# Patient Record
Sex: FEMALE | Race: BLACK OR AFRICAN AMERICAN | NOT HISPANIC OR LATINO | Employment: OTHER | URBAN - METROPOLITAN AREA
[De-identification: names, ages, dates, MRNs, and addresses within clinical notes are randomized per-mention and may not be internally consistent; named-entity substitution may affect disease eponyms.]

---

## 2022-04-07 RX ORDER — HYDRALAZINE HYDROCHLORIDE 50 MG/1
75 TABLET, FILM COATED ORAL 3 TIMES DAILY
COMMUNITY
End: 2022-09-02

## 2022-04-07 RX ORDER — GLIMEPIRIDE 1 MG/1
1 TABLET ORAL
COMMUNITY
End: 2022-06-23

## 2022-04-07 RX ORDER — CYANOCOBALAMIN (VITAMIN B-12) 500 MCG
TABLET ORAL
COMMUNITY
End: 2022-04-08

## 2022-04-07 RX ORDER — MUPIROCIN 20 MG/G
OINTMENT TOPICAL
COMMUNITY
End: 2022-09-02

## 2022-04-07 RX ORDER — PNV NO.95/FERROUS FUM/FOLIC AC 28MG-0.8MG
100 TABLET ORAL DAILY
COMMUNITY

## 2022-04-07 RX ORDER — CALCITRIOL 0.25 UG/1
0.5 CAPSULE ORAL DAILY
COMMUNITY
End: 2022-07-11 | Stop reason: SDUPTHER

## 2022-04-07 RX ORDER — AMLODIPINE BESYLATE 5 MG/1
5 TABLET ORAL 2 TIMES DAILY
COMMUNITY
End: 2023-01-06 | Stop reason: SDUPTHER

## 2022-04-07 RX ORDER — METFORMIN HYDROCHLORIDE 1000 MG/1
TABLET ORAL
COMMUNITY
End: 2022-04-08

## 2022-04-07 RX ORDER — VIT C/E/ZN/COPPR/LUTEIN/ZEAXAN 250MG-90MG
CAPSULE ORAL
COMMUNITY
End: 2022-04-08

## 2022-04-07 RX ORDER — VALSARTAN 160 MG/1
320 TABLET ORAL DAILY
COMMUNITY
End: 2022-06-23

## 2022-04-07 RX ORDER — DOXYCYCLINE 100 MG/1
CAPSULE ORAL
COMMUNITY
End: 2022-04-08

## 2022-04-07 RX ORDER — HYDROCODONE BITARTRATE AND ACETAMINOPHEN 5; 325 MG/1; MG/1
TABLET ORAL
COMMUNITY
End: 2022-04-08

## 2022-04-07 RX ORDER — SODIUM BICARBONATE 325 MG/1
650 TABLET ORAL 2 TIMES DAILY
COMMUNITY
End: 2022-11-04 | Stop reason: SDUPTHER

## 2022-04-07 RX ORDER — ATORVASTATIN CALCIUM 20 MG/1
20 TABLET, FILM COATED ORAL DAILY
COMMUNITY

## 2022-04-07 RX ORDER — FLUTICASONE PROPIONATE 50 MCG
2 SPRAY, SUSPENSION (ML) NASAL
COMMUNITY

## 2022-04-08 ENCOUNTER — OFFICE VISIT (OUTPATIENT)
Dept: NEPHROLOGY | Facility: CLINIC | Age: 72
End: 2022-04-08
Payer: MEDICARE

## 2022-04-08 VITALS
BODY MASS INDEX: 32.85 KG/M2 | HEIGHT: 66 IN | DIASTOLIC BLOOD PRESSURE: 74 MMHG | OXYGEN SATURATION: 99 % | HEART RATE: 57 BPM | SYSTOLIC BLOOD PRESSURE: 207 MMHG | TEMPERATURE: 98 F | WEIGHT: 204.38 LBS

## 2022-04-08 DIAGNOSIS — E11.9 DIABETES MELLITUS TYPE II, NON INSULIN DEPENDENT: Primary | ICD-10-CM

## 2022-04-08 DIAGNOSIS — N18.5 STAGE 5 CHRONIC KIDNEY DISEASE NOT ON CHRONIC DIALYSIS: ICD-10-CM

## 2022-04-08 DIAGNOSIS — R80.9 NEPHROTIC RANGE PROTEINURIA: ICD-10-CM

## 2022-04-08 DIAGNOSIS — E87.20 METABOLIC ACIDOSIS: ICD-10-CM

## 2022-04-08 DIAGNOSIS — I10 ESSENTIAL HYPERTENSION: ICD-10-CM

## 2022-04-08 DIAGNOSIS — N25.81 SECONDARY HYPERPARATHYROIDISM OF RENAL ORIGIN: ICD-10-CM

## 2022-04-08 PROCEDURE — 4010F ACE/ARB THERAPY RXD/TAKEN: CPT | Mod: CPTII,,, | Performed by: NURSE PRACTITIONER

## 2022-04-08 PROCEDURE — 1160F PR REVIEW ALL MEDS BY PRESCRIBER/CLIN PHARMACIST DOCUMENTED: ICD-10-PCS | Mod: CPTII,,, | Performed by: NURSE PRACTITIONER

## 2022-04-08 PROCEDURE — 99214 PR OFFICE/OUTPT VISIT, EST, LEVL IV, 30-39 MIN: ICD-10-PCS | Mod: ,,, | Performed by: NURSE PRACTITIONER

## 2022-04-08 PROCEDURE — 3066F NEPHROPATHY DOC TX: CPT | Mod: CPTII,,, | Performed by: NURSE PRACTITIONER

## 2022-04-08 PROCEDURE — 4010F PR ACE/ARB THEARPY RXD/TAKEN: ICD-10-PCS | Mod: CPTII,,, | Performed by: NURSE PRACTITIONER

## 2022-04-08 PROCEDURE — 1160F RVW MEDS BY RX/DR IN RCRD: CPT | Mod: CPTII,,, | Performed by: NURSE PRACTITIONER

## 2022-04-08 PROCEDURE — 3066F PR DOCUMENTATION OF TREATMENT FOR NEPHROPATHY: ICD-10-PCS | Mod: CPTII,,, | Performed by: NURSE PRACTITIONER

## 2022-04-08 PROCEDURE — 3078F DIAST BP <80 MM HG: CPT | Mod: CPTII,,, | Performed by: NURSE PRACTITIONER

## 2022-04-08 PROCEDURE — 1159F MED LIST DOCD IN RCRD: CPT | Mod: CPTII,,, | Performed by: NURSE PRACTITIONER

## 2022-04-08 PROCEDURE — 3288F FALL RISK ASSESSMENT DOCD: CPT | Mod: CPTII,,, | Performed by: NURSE PRACTITIONER

## 2022-04-08 PROCEDURE — 1159F PR MEDICATION LIST DOCUMENTED IN MEDICAL RECORD: ICD-10-PCS | Mod: CPTII,,, | Performed by: NURSE PRACTITIONER

## 2022-04-08 PROCEDURE — 1101F PT FALLS ASSESS-DOCD LE1/YR: CPT | Mod: CPTII,,, | Performed by: NURSE PRACTITIONER

## 2022-04-08 PROCEDURE — 3078F PR MOST RECENT DIASTOLIC BLOOD PRESSURE < 80 MM HG: ICD-10-PCS | Mod: CPTII,,, | Performed by: NURSE PRACTITIONER

## 2022-04-08 PROCEDURE — 1126F AMNT PAIN NOTED NONE PRSNT: CPT | Mod: CPTII,,, | Performed by: NURSE PRACTITIONER

## 2022-04-08 PROCEDURE — 1101F PR PT FALLS ASSESS DOC 0-1 FALLS W/OUT INJ PAST YR: ICD-10-PCS | Mod: CPTII,,, | Performed by: NURSE PRACTITIONER

## 2022-04-08 PROCEDURE — 3288F PR FALLS RISK ASSESSMENT DOCUMENTED: ICD-10-PCS | Mod: CPTII,,, | Performed by: NURSE PRACTITIONER

## 2022-04-08 PROCEDURE — 3077F SYST BP >= 140 MM HG: CPT | Mod: CPTII,,, | Performed by: NURSE PRACTITIONER

## 2022-04-08 PROCEDURE — 3077F PR MOST RECENT SYSTOLIC BLOOD PRESSURE >= 140 MM HG: ICD-10-PCS | Mod: CPTII,,, | Performed by: NURSE PRACTITIONER

## 2022-04-08 PROCEDURE — 99214 OFFICE O/P EST MOD 30 MIN: CPT | Mod: ,,, | Performed by: NURSE PRACTITIONER

## 2022-04-08 PROCEDURE — 3008F BODY MASS INDEX DOCD: CPT | Mod: CPTII,,, | Performed by: NURSE PRACTITIONER

## 2022-04-08 PROCEDURE — 3008F PR BODY MASS INDEX (BMI) DOCUMENTED: ICD-10-PCS | Mod: CPTII,,, | Performed by: NURSE PRACTITIONER

## 2022-04-08 PROCEDURE — 1126F PR PAIN SEVERITY QUANTIFIED, NO PAIN PRESENT: ICD-10-PCS | Mod: CPTII,,, | Performed by: NURSE PRACTITIONER

## 2022-04-08 NOTE — PROGRESS NOTES
Pt Name:  Mekhi Tinsley  Pt :  1950  Pt MRN:  [id number]    Date: 2022    Reason for visit:   Mekhi Tinsley is a 72 y.o. aa female presenting for CKD follow up with serum creatinine 3.37, eGFR 15 and Chronic Kidney Disease Stage 5.     Chief Complaint:   The patient denies any complaints today.    HPI:  The patient is being seen today for follow up CKD and the status of her kidney function. Since the last visit, patient reports no health changes.  The patient denies fatigue, weakness, poor appetite, metallic taste, nausea, cramping, chest pain, palpitations, SOB, orthopnea, PND, edema, trouble concentrating, decreased urination.   She is accompanied to her visit today by her daughter, Justine Tinsley.     Home BPs when checked are <130 - 170/70 - 80 per patient. The patient is following a low sodium diet. The patient is avoiding NSAIDs. The patient is drinking 64 oz of water daily.     Since last visit on 22 patient reports no changes in medical history.      History:   Past Medical History:   Diagnosis Date    Diverticulosis     DM type 2 (diabetes mellitus, type 2)     DVT (deep vein thrombosis) in pregnancy     HTN (hypertension)     Mild intermittent asthma, uncomplicated     PAD (peripheral artery disease)     S/P IVC filter      Past Surgical History:   Procedure Laterality Date     VENA CAVA FILTER PLACEMENT      COLONOSCOPY      HYSTERECTOMY       Family History   Problem Relation Age of Onset    Hypertension Mother     Hypertension Father     Hypertension Sister     Heart failure Sister     Hypertension Brother      Social History     Substance and Sexual Activity   Alcohol Use Never     Social History     Substance and Sexual Activity   Drug Use Never     Social History     Substance and Sexual Activity   Sexual Activity Never     reports never being sexually active.  Social History     Tobacco Use   Smoking Status Never Smoker   Smokeless Tobacco Never Used        Allergies:  Review of patient's allergies indicates:   Allergen Reactions    Ace inhibitors Swelling    Levofloxacin Other (See Comments)     Dropped fsbs to 50      Irbesartan Nausea Only    Tramadol Nausea Only         Current Outpatient Medications:     amLODIPine (NORVASC) 5 MG tablet, Take 5 mg by mouth once daily., Disp: , Rfl:     atorvastatin (LIPITOR) 20 MG tablet, Take 20 mg by mouth once daily., Disp: , Rfl:     calcitRIOL (ROCALTROL) 0.25 MCG Cap, Take 0.25 mcg by mouth once daily., Disp: , Rfl:     cyanocobalamin (VITAMIN B-12) 100 MCG tablet, Vitamin B12  1 tablet qd, Disp: , Rfl:     fluticasone propionate (FLONASE) 50 mcg/actuation nasal spray, 1 spray as needed., Disp: , Rfl:     glimepiride (AMARYL) 1 MG tablet, Take 1 mg by mouth daily with breakfast., Disp: , Rfl:     hydrALAZINE (APRESOLINE) 50 MG tablet, Take 50 mg by mouth 3 (three) times daily., Disp: , Rfl:     metoprolol succinate 50 mg CSpX, Take 50 mg by mouth once daily at 6am., Disp: , Rfl:     mupirocin (BACTROBAN) 2 % ointment, mupirocin 2 % topical ointment  APPLY OINTMENT TOPICALLY TO AFFECTED AREA THREE TIMES DAILY, Disp: , Rfl:     sodium bicarbonate 325 MG tablet, Take 325 mg by mouth 2 (two) times daily., Disp: , Rfl:     valsartan (DIOVAN) 160 MG tablet, Take 320 mg by mouth once daily., Disp: , Rfl:     ROS:  Review of Systems   Constitutional: Negative for activity change and appetite change.   HENT: Negative for hearing loss.    Eyes: Negative for visual disturbance.   Respiratory: Negative for shortness of breath and wheezing.    Cardiovascular: Negative for chest pain.   Gastrointestinal: Negative for abdominal pain, diarrhea, nausea and vomiting.   Genitourinary: Negative for decreased urine volume, dysuria, flank pain, frequency and urgency.   Neurological: Negative for dizziness, weakness and headaches.       Physical Exam:   Vitals:   Vitals:    04/08/22 0946 04/08/22 1010   BP: (!) 191/72 (!)  "207/74   Pulse: (!) 57    Temp: 98.4 °F (36.9 °C)    SpO2: 99%    Weight: 92.7 kg (204 lb 5.9 oz)    Height: 5' 6" (1.676 m)    PainSc: 0-No pain      Body mass index is 32.99 kg/m².    Physical Exam  Vitals and nursing note reviewed.   Constitutional:       General: She is not in acute distress.     Appearance: Normal appearance.   HENT:      Head: Normocephalic and atraumatic.      Mouth/Throat:      Mouth: Mucous membranes are moist.   Eyes:      Extraocular Movements: Extraocular movements intact.      Pupils: Pupils are equal, round, and reactive to light.   Cardiovascular:      Rate and Rhythm: Normal rate and regular rhythm.      Heart sounds: Murmur heard.    Systolic murmur is present with a grade of 3/6.  Pulmonary:      Effort: Pulmonary effort is normal.      Breath sounds: No wheezing, rhonchi or rales.   Musculoskeletal:         General: No swelling.      Right lower le+ Edema present.      Left lower le+ Edema present.   Skin:     General: Skin is warm and dry.   Neurological:      Mental Status: She is alert and oriented to person, place, and time.   Psychiatric:         Mood and Affect: Mood normal.         Behavior: Behavior normal.         Labs/Tests:  Component   Ref Range & Units 1 d ago 9 d ago 1 mo ago 2 mo ago 3 mo ago 4 mo ago 5 mo ago     Sodium   136 - 147 mmol/L 143  142  140  142  141  142  142     Potassium   3.5 - 5.1 mmol/L 3.9  3.7  3.8  4.0  4.0  4.1  4.4     Chloride   98 - 107 mmol/L 106  109 High   106  112 High   108 High   108 High   111 High      CO2   20 - 31 mmol/L 26  28  23  22  23  24  21     Glucose   70 - 99 mg/dL 88  89  96  86  71  80  79     BUN   9 - 23 mg/dL 30 High   30 High   35 High   26 High   31 High   28 High   42 High      Creatinine   0.55 - 1.02 mg/dL 3.37 High   3.24 High   2.90 High   2.42 High   2.90 High   2.49 High   2.95 High      Calcium   8.3 - 10.6 mg/dL 8.6  8.0 Low   8.7  8.8  9.0  9.2  9.1     Phosphorus   2.4 - 5.1 mg/dL 4.2    3.1     "    Albumin   3.2 - 4.8 g/dL 3.2  3.0 Low   3.3  3.4  3.7  3.9  4.1     eGFR CKD-EPI Non Af Amer   >90 mL/min/1.73m2 13 Low   13 Low   15 Low   19 Low   15 Low   19 Low   15 Low      eGFR CKD-EPI Af Amer   >90 mL/min/1.73m2 15 Low   15 Low   18 Low   22 Low  CM  18 Low   21 Low   1      Component   Ref Range & Units 1 d ago   (4/7/22) 2 mo ago   (1/26/22) 6 mo ago   (9/16/21) 9 mo ago   (6/22/21) 1 yr ago   (12/29/20) 1 yr ago   (9/29/20) 1 yr ago   (6/29/20)    PTH, Intact   15 - 65 pg/mL 448 High   318 High   236 High   247 High   225 High   180 High   251 High      Hemoglobin   11.3 - 15.4 g/dL 12.3  11.6  12.0  12.2  10.5 Low   10.6 Low   9.8 Low       Component   Ref Range & Units 1 d ago   (4/7/22) 2 mo ago   (1/26/22) 6 mo ago   (9/16/21) 9 mo ago   (6/22/21) 1 yr ago   (12/29/20) 1 yr ago   (9/29/20) 1 yr ago   (6/29/20)     Protein Urine Random   mg/dL 797  769  258  85  85  380  538     Creatinine, Ur   mg/dL 59.0  52.2  74.0  40.3  32.3  68.8  83.0     Protein/Creat Ratio   <0.2 mg of protein/mg of creatinine 13.5 High   14.7 High   3.5 High   2.1 High   2.6 High   5.5 High   6.5 High            Diagnosis:  1. Diabetes mellitus type II, non insulin dependent  Protein/Creatinine Ratio, Urine    Renal Function Panel    Protein/Creatinine Ratio, Urine    Renal Function Panel   2. Essential hypertension  Renal Function Panel    Renal Function Panel   3. Nephrotic range proteinuria  Protein/Creatinine Ratio, Urine    Protein/Creatinine Ratio, Urine   4. Secondary hyperparathyroidism of renal origin  PTH, Intact    PTH, Intact   5. Metabolic acidosis  Renal Function Panel    Renal Function Panel   6. Stage 5 chronic kidney disease not on chronic dialysis  CBC Auto Differential    Protein/Creatinine Ratio, Urine    PTH, Intact    Renal Function Panel    CBC Auto Differential    Protein/Creatinine Ratio, Urine    PTH, Intact    Renal Function Panel    US Vein Mapping, Hemodialysis, Bilateral    Ambulatory  referral/consult to General Surgery    US Vein Mapping, Hemodialysis, Bilateral       Plan:  1. Diabetes mellitus type II, non insulin dependent - control status unknown - Suggest to PCP to d/c  Glimepiride 1 mg po daily (not recommended to use with eGFR < 30) but she is on a small dose.  Would recommend Glipizide as an alternative.    2. Essential hypertension - not at goal - Monitor BP twice daily for 2 weeks and return readings, 2 Gram NA diet, Continue Toprol XL 50 mg po daily,  Increase Norvasc 10 mg po daily, Continue Hydralazine to 50 mg po TID, Increase Diovan to 320 mg po daily    3. Nephrotic range proteinuria - 13,500 mg - down from 14,700 mg - Continue Diovan to 320 mg po daily    4. Secondary hyperparathyroidism of renal origin -  - up from 318 -  Increase Rocaltrol 50 mcg po daily    5. Metabolic acidosis - C02 26 - Continue NA Hc03 650 mg po BID    6. Stage 5 chronic kidney disease not on chronic dialysis - serum creatinine 3.37 / eGFR 15; Avoid NSAIDS, Assure 64 oz of water daily, Meds per # 1, 2, 4, 5 above; Schedule Vein Mapping; Refer to Surgeon for vascular access; She has attended a CKD education class          My reconciliation of medication should not condone or support use of medications that have been previously prescribed for patients by other providers.  I am only documenting the current medications patients is taking and may change doses, add or discontinue medications based on information provided by the patient.     The patient has been provided with their current level of kidney function including eGFR and creatinine.    We discussed the potential for common complications of CKD including anemia, electrolyte abnormalities, abnormal fluid balance, mineral bone disease and malnutrition.    We discussed strategies to slow the progression of their kidney disease includin. Avoid nephrotoxic agents. Avoid over-the-counter and prescription NSAIDs (Ibuprofren, Motrin, Naproxyn,  Aleve, Mobic, Celebrex, Toradol, Advil). All of these can worsen kidney function, elevate BP, cause fluid retention/swelling and elevate potassium. Avoid iodine contrast agents and gadolinium, which can worsen kidney function and/or cause kidney failure. Avoid phosphosoda bowel preps which can worsen kidney function.  2. Work to improve modifiable risk factors. Aim for good control of blood glucose without episodes of hypoglycemia. Notify the provider managing your diabetes if your blood glucose < 60. Aim for good blood pressure control without episodes of hypotension. Call the office if your systolic blood pressure is consistently < 110. Aim for good control of your cholesterol.  ? AIC goal <7.0  ? BP goal <130/80        Keeping these in goal range will help prevent progression of cardiovascular disease            and chronic kidney disease.    We discussed dietary modifications:  ? Low sodium diet: 2 gm/d or less  ? Limit/avoid high potassium foods  ? Avoid potassium containing salt substitutes  ? Limit/avoid high phosphorus foods  ? Limit daily protein intake to 0.8-1 gm/kg of your ideal body weight.    We discussed lifestyle modifications:  ? Make sure you are drinking plenty of fluids--64 ounces (1/2 gallon) daily  ? Exercise at least 30 minutes 5 x per week (total 150 minutes per week), example brisk walking  ? Achieve and maintain a healthy weight (BMI 20-25)  ? Limit alcohol consumption to <2 drinks per day  ? Stop smoking  ? Make sure you stay current on important vaccines-- pneumonia vaccines (Pneumovax and Prevnar), flu vaccine, Hepatitis B (especially patients nearing renal replacement therapy and planning hemodialysis) and Covid-19 vaccine.     Recommendations:  1. Monitor your BP at home daily and record.  2. Bring readings to your next appt.  3. Call the office if your BP is persistently >130/80.  4. Seek urgent medical attention with signs and symptoms of uremia - extreme weakness, fatigue,  confusion, anorexia, metallic taste in mouth, hiccoughs, cramping, itching, chest pain, swelling, or trouble sleeping.    Follow Up:   Follow up in about 3 months (around 7/8/2022).

## 2022-04-08 NOTE — PATIENT INSTRUCTIONS
The patient has been provided with their current level of kidney function including eGFR and creatinine.    We discussed the potential for common complications of CKD including anemia, electrolyte abnormalities, abnormal fluid balance, mineral bone disease and malnutrition.    We discussed strategies to slow the progression of their kidney disease including:  Avoid nephrotoxic agents. Avoid over-the-counter and prescription NSAIDs (Ibuprofren, Motrin, Naproxyn, Aleve, Mobic, Celebrex, Toradol, Advil). All of these can worsen kidney function, elevate BP, cause fluid retention/swelling and elevate potassium. Avoid iodine contrast agents and gadolinium, which can worsen kidney function and/or cause kidney failure. Avoid phosphosoda bowel preps which can worsen kidney function.  Work to improve modifiable risk factors. Aim for good control of blood glucose without episodes of hypoglycemia. Notify the provider managing your diabetes if your blood glucose < 60. Aim for good blood pressure control without episodes of hypotension. Call the office if your systolic blood pressure is consistently < 110. Aim for good control of your cholesterol.  AIC goal <7.0  BP goal <130/80        Keeping these in goal range will help prevent progression of cardiovascular disease            and chronic kidney disease.    We discussed dietary modifications:  Low sodium diet: 2 gm/d or less  Limit/avoid high potassium foods  Avoid potassium containing salt substitutes  Limit/avoid high phosphorus foods  Limit daily protein intake to 0.8-1 gm/kg of your ideal body weight.    We discussed lifestyle modifications:  Make sure you are drinking plenty of fluids--64 ounces (1/2 gallon) daily  Exercise at least 30 minutes 5 x per week (total 150 minutes per week), example brisk walking  Achieve and maintain a healthy weight (BMI 20-25)  Limit alcohol consumption to <2 drinks per day  Stop smoking  Make sure you stay current on important vaccines--  pneumonia vaccines (Pneumovax and Prevnar), flu vaccine, Hepatitis B (especially patients nearing renal replacement therapy and planning hemodialysis) and Covid-19 vaccine.     Recommendations:  Monitor your BP at home daily and record.  Bring readings to your next appt.  Call the office if your BP is persistently >130/80.  Seek urgent medical attention with signs and symptoms of uremia - extreme weakness, fatigue, confusion, anorexia, metallic taste in mouth, hiccoughs, cramping, itching, chest pain, swelling, or trouble sleeping.

## 2022-06-22 PROBLEM — D63.1 ANEMIA IN STAGE 5 CHRONIC KIDNEY DISEASE, NOT ON CHRONIC DIALYSIS: Status: ACTIVE | Noted: 2022-06-22

## 2022-06-22 PROBLEM — N18.5 ANEMIA IN STAGE 5 CHRONIC KIDNEY DISEASE, NOT ON CHRONIC DIALYSIS: Status: ACTIVE | Noted: 2022-06-22

## 2022-06-23 ENCOUNTER — OFFICE VISIT (OUTPATIENT)
Dept: NEPHROLOGY | Facility: CLINIC | Age: 72
End: 2022-06-23
Payer: MEDICARE

## 2022-06-23 VITALS
DIASTOLIC BLOOD PRESSURE: 64 MMHG | HEIGHT: 66 IN | OXYGEN SATURATION: 97 % | TEMPERATURE: 99 F | BODY MASS INDEX: 31.63 KG/M2 | HEART RATE: 60 BPM | WEIGHT: 196.81 LBS | SYSTOLIC BLOOD PRESSURE: 179 MMHG

## 2022-06-23 DIAGNOSIS — E87.20 METABOLIC ACIDOSIS: ICD-10-CM

## 2022-06-23 DIAGNOSIS — R80.9 NEPHROTIC RANGE PROTEINURIA: ICD-10-CM

## 2022-06-23 DIAGNOSIS — N25.81 SECONDARY HYPERPARATHYROIDISM OF RENAL ORIGIN: ICD-10-CM

## 2022-06-23 DIAGNOSIS — I10 ESSENTIAL HYPERTENSION: ICD-10-CM

## 2022-06-23 DIAGNOSIS — E11.9 DIABETES MELLITUS TYPE II, NON INSULIN DEPENDENT: Primary | ICD-10-CM

## 2022-06-23 DIAGNOSIS — N18.5 STAGE 5 CHRONIC KIDNEY DISEASE NOT ON CHRONIC DIALYSIS: ICD-10-CM

## 2022-06-23 DIAGNOSIS — N18.5 ANEMIA IN STAGE 5 CHRONIC KIDNEY DISEASE, NOT ON CHRONIC DIALYSIS: ICD-10-CM

## 2022-06-23 DIAGNOSIS — D63.1 ANEMIA IN STAGE 5 CHRONIC KIDNEY DISEASE, NOT ON CHRONIC DIALYSIS: ICD-10-CM

## 2022-06-23 PROCEDURE — 99214 PR OFFICE/OUTPT VISIT, EST, LEVL IV, 30-39 MIN: ICD-10-PCS | Mod: ,,, | Performed by: NURSE PRACTITIONER

## 2022-06-23 PROCEDURE — 3066F PR DOCUMENTATION OF TREATMENT FOR NEPHROPATHY: ICD-10-PCS | Mod: CPTII,,, | Performed by: NURSE PRACTITIONER

## 2022-06-23 PROCEDURE — 3066F NEPHROPATHY DOC TX: CPT | Mod: CPTII,,, | Performed by: NURSE PRACTITIONER

## 2022-06-23 PROCEDURE — 1160F PR REVIEW ALL MEDS BY PRESCRIBER/CLIN PHARMACIST DOCUMENTED: ICD-10-PCS | Mod: CPTII,,, | Performed by: NURSE PRACTITIONER

## 2022-06-23 PROCEDURE — 3078F PR MOST RECENT DIASTOLIC BLOOD PRESSURE < 80 MM HG: ICD-10-PCS | Mod: CPTII,,, | Performed by: NURSE PRACTITIONER

## 2022-06-23 PROCEDURE — 3288F FALL RISK ASSESSMENT DOCD: CPT | Mod: CPTII,,, | Performed by: NURSE PRACTITIONER

## 2022-06-23 PROCEDURE — 99214 OFFICE O/P EST MOD 30 MIN: CPT | Mod: ,,, | Performed by: NURSE PRACTITIONER

## 2022-06-23 PROCEDURE — 1159F PR MEDICATION LIST DOCUMENTED IN MEDICAL RECORD: ICD-10-PCS | Mod: CPTII,,, | Performed by: NURSE PRACTITIONER

## 2022-06-23 PROCEDURE — 1126F AMNT PAIN NOTED NONE PRSNT: CPT | Mod: CPTII,,, | Performed by: NURSE PRACTITIONER

## 2022-06-23 PROCEDURE — 1101F PT FALLS ASSESS-DOCD LE1/YR: CPT | Mod: CPTII,,, | Performed by: NURSE PRACTITIONER

## 2022-06-23 PROCEDURE — 1160F RVW MEDS BY RX/DR IN RCRD: CPT | Mod: CPTII,,, | Performed by: NURSE PRACTITIONER

## 2022-06-23 PROCEDURE — 1159F MED LIST DOCD IN RCRD: CPT | Mod: CPTII,,, | Performed by: NURSE PRACTITIONER

## 2022-06-23 PROCEDURE — 3008F BODY MASS INDEX DOCD: CPT | Mod: CPTII,,, | Performed by: NURSE PRACTITIONER

## 2022-06-23 PROCEDURE — 1101F PR PT FALLS ASSESS DOC 0-1 FALLS W/OUT INJ PAST YR: ICD-10-PCS | Mod: CPTII,,, | Performed by: NURSE PRACTITIONER

## 2022-06-23 PROCEDURE — 3077F PR MOST RECENT SYSTOLIC BLOOD PRESSURE >= 140 MM HG: ICD-10-PCS | Mod: CPTII,,, | Performed by: NURSE PRACTITIONER

## 2022-06-23 PROCEDURE — 3008F PR BODY MASS INDEX (BMI) DOCUMENTED: ICD-10-PCS | Mod: CPTII,,, | Performed by: NURSE PRACTITIONER

## 2022-06-23 PROCEDURE — 3078F DIAST BP <80 MM HG: CPT | Mod: CPTII,,, | Performed by: NURSE PRACTITIONER

## 2022-06-23 PROCEDURE — 1126F PR PAIN SEVERITY QUANTIFIED, NO PAIN PRESENT: ICD-10-PCS | Mod: CPTII,,, | Performed by: NURSE PRACTITIONER

## 2022-06-23 PROCEDURE — 3288F PR FALLS RISK ASSESSMENT DOCUMENTED: ICD-10-PCS | Mod: CPTII,,, | Performed by: NURSE PRACTITIONER

## 2022-06-23 PROCEDURE — 3077F SYST BP >= 140 MM HG: CPT | Mod: CPTII,,, | Performed by: NURSE PRACTITIONER

## 2022-06-23 RX ORDER — GLIPIZIDE 2.5 MG/1
2.5 TABLET, EXTENDED RELEASE ORAL DAILY
COMMUNITY
End: 2022-11-04 | Stop reason: SDUPTHER

## 2022-06-23 NOTE — PROGRESS NOTES
Pt Name:  Mekhi Tinsley  Pt :  1950  Pt MRN:  [id number]    Date: 2022    Reason for visit:   Mekhi Tinsley is a 72 y.o. aa female presenting for CKD follow up with serum creatinine 4.38, eGFR 11 and Chronic Kidney Disease Stage 5.     Chief Complaint:   The patient denies any complaints today.    HPI:  The patient is being seen today for follow up CKD and the status of her kidney function. Since the last visit, patient reports/record review reveals she completed the vein mapping on 22..  Was referred to Dr. Abraham for AV access but has not been seen by him / no appointment contact made from his office following the referral that was placed on 22 at her last OV. The patient denies fatigue, weakness, poor appetite, metallic taste, nausea, cramping, chest pain, palpitations, SOB, orthopnea, PND, edema, trouble concentrating, decreased urination.      Home BPs when checked are <130 -170/80 per patient. She reports her PCP, Cierra Hill stopped her Losartan on or around 22.  The patient is following a low sodium diet. The patient is avoiding NSAIDs. The patient is drinking 48 - 64 oz of water daily.     Since last visit on 22 patient reports above noted changes in medical history.      History:   Past Medical History:   Diagnosis Date    Disorder of kidney and ureter     Diverticulosis     DM type 2 (diabetes mellitus, type 2)     DVT (deep vein thrombosis) in pregnancy     HTN (hypertension)     Mild intermittent asthma, uncomplicated     PAD (peripheral artery disease)     S/P IVC filter      Past Surgical History:   Procedure Laterality Date     VENA CAVA FILTER PLACEMENT      COLONOSCOPY      HYSTERECTOMY       Family History   Problem Relation Age of Onset    Hypertension Mother     Hypertension Father     Hypertension Sister     Heart failure Sister     Hypertension Brother      Social History     Substance and Sexual Activity   Alcohol Use Never      Social History     Substance and Sexual Activity   Drug Use Never     Social History     Substance and Sexual Activity   Sexual Activity Never     reports never being sexually active.  Social History     Tobacco Use   Smoking Status Never Smoker   Smokeless Tobacco Never Used       Allergies:  Review of patient's allergies indicates:   Allergen Reactions    Ace inhibitors Swelling    Levofloxacin Other (See Comments)     Dropped fsbs to 50      Irbesartan Nausea Only    Tramadol Nausea Only         Current Outpatient Medications:     amLODIPine (NORVASC) 5 MG tablet, Take 10 mg by mouth once daily., Disp: , Rfl:     atorvastatin (LIPITOR) 20 MG tablet, Take 20 mg by mouth once daily., Disp: , Rfl:     calcitRIOL (ROCALTROL) 0.25 MCG Cap, Take 0.5 mcg by mouth once daily., Disp: , Rfl:     cyanocobalamin (VITAMIN B-12) 100 MCG tablet, Vitamin B12  1 tablet qd, Disp: , Rfl:     fluticasone propionate (FLONASE) 50 mcg/actuation nasal spray, 1 spray as needed., Disp: , Rfl:     glipiZIDE (GLUCOTROL) 2.5 MG TR24, Take 2.5 mg by mouth once daily at 6am., Disp: , Rfl:     hydrALAZINE (APRESOLINE) 50 MG tablet, Take 75 mg by mouth 3 (three) times daily., Disp: , Rfl:     metoprolol succinate 50 mg CSpX, Take 50 mg by mouth once daily at 6am., Disp: , Rfl:     mupirocin (BACTROBAN) 2 % ointment, mupirocin 2 % topical ointment  APPLY OINTMENT TOPICALLY TO AFFECTED AREA THREE TIMES DAILY, Disp: , Rfl:     sodium bicarbonate 325 MG tablet, Take 650 mg by mouth 2 (two) times daily., Disp: , Rfl:     ROS:  Review of Systems   Constitutional: Negative for activity change and appetite change.   HENT: Negative for hearing loss.    Eyes: Negative for visual disturbance.   Respiratory: Negative for shortness of breath and wheezing.    Cardiovascular: Negative for chest pain.   Gastrointestinal: Negative for abdominal pain, diarrhea, nausea and vomiting.   Genitourinary: Negative for decreased urine volume, dysuria,  "flank pain, frequency and urgency.   Neurological: Negative for dizziness, weakness and headaches.       Physical Exam:   Vitals:   Vitals:    22 1540 22 1556   BP: (!) 170/70 (!) 179/64   Pulse: 69 60   Temp: 98.9 °F (37.2 °C)    SpO2: 97%    Weight: 89.3 kg (196 lb 12.8 oz)    Height: 5' 6" (1.676 m)    PainSc: 0-No pain      Body mass index is 31.76 kg/m².    Physical Exam  Vitals reviewed.   Constitutional:       General: She is not in acute distress.     Appearance: Normal appearance.   HENT:      Head: Normocephalic and atraumatic.      Mouth/Throat:      Mouth: Mucous membranes are moist.   Eyes:      Extraocular Movements: Extraocular movements intact.      Pupils: Pupils are equal, round, and reactive to light.   Cardiovascular:      Rate and Rhythm: Normal rate and regular rhythm.      Heart sounds: Murmur heard.    Systolic murmur is present with a grade of 3/6.  Pulmonary:      Effort: Pulmonary effort is normal.      Breath sounds: No wheezing, rhonchi or rales.   Musculoskeletal:         General: No swelling.      Right lower le+ Edema present.      Left lower le+ Edema present.   Skin:     General: Skin is warm and dry.   Neurological:      Mental Status: She is alert and oriented to person, place, and time.   Psychiatric:         Mood and Affect: Mood normal.         Behavior: Behavior normal.           Labs/Tests:  Lab Results   Component Value Date     2022    K 4.2 2022     (H) 2022    CO2 28 2022    BUN 46 (H) 2022    CREATININE 4.38 (H) 2022    CREATININE 3.37 (H) 2022    CREATININE 3.24 (H) 2022    GLUCOSE 180 (H) 2022    CALCIUM 8.9 2022    ALBUMIN 3.6 2022    EGFRNONAA 9 (L) 2022    EGFRNONAA 13 (L) 2022    EGFRNONAA 13 (L) 2022     (H) 2022    HGB 10.6 (L) 2022    IRON 100 2022    TIBC 213 (L) 2022    FERRITIN 896.8 (H) 2022     Component " Ref Range & Units 12:32   (6/22/22) 2 mo ago   (4/7/22) 4 mo ago   (1/26/22) 9 mo ago   (9/16/21) 1 yr ago   (6/22/21) 1 yr ago   (12/29/20) 1 yr ago   (9/29/20)   Protein, Urine mg/dL mg/dL 637  797 R  769 R  258 R  85 R  85 R  380 R    Creatinine, Urine mg/dL mg/dL 79.2  59.0 R  52.2 R  74.0 R  40.3 R  32.3 R  68.8 R    Urine Protein/Creatinine Ratio <0.2 mg of protein/mg of creatinine 8.0 High   13.5 High   14.7 High   3.5 High   2.1 High   2.6 High   5.5 High            Diagnosis:  1. Diabetes mellitus type II, non insulin dependent  Renal Function Panel    Renal Function Panel   2. Essential hypertension  Renal Function Panel    Renal Function Panel   3. Nephrotic range proteinuria  Renal Function Panel    Protein/Creatinine Ratio, Urine    Renal Function Panel    Protein/Creatinine Ratio, Urine   4. Metabolic acidosis  Renal Function Panel    Renal Function Panel   5. Secondary hyperparathyroidism of renal origin  Renal Function Panel    PTH, Intact    Renal Function Panel    PTH, Intact   6. Stage 5 chronic kidney disease not on chronic dialysis  Renal Function Panel    Renal Function Panel   7. Anemia in stage 5 chronic kidney disease, not on chronic dialysis  Hemoglobin    Hemoglobin         Plan:    1. Diabetes mellitus type II, non insulin dependent - control status unknown - Continue Glucotrol    2. Essential hypertension - not at goal -  Monitor BP twice daily for 2 weeks and submit readings, 2 Gram NA diet, Continue Toprol XL 50 mg po daily, Norvasc 10 mg po daily, increase Hydralazine to 100 mg po TID,      3. Nephrotic range proteinuria - 800 mg - down from 13,500 mg - currently off ARB due to decline in eGFR per PCP    4. Metabolic acidosis - C02 28 - Continue NA Hc03 650 mg po BID    5. Secondary hyperparathyroidism of renal origin -  - Continue Rocaltrol 50 mcg po daily    6. Stage 5 chronic kidney disease not on chronic dialysis - serum creatinine 4.38 / eGFR 11; (last visit 3.37 /15)  Avoid NSAIDS, Assure 64 oz of water daily; Meds per # 1, 2, 3, 5   Has had vein mapping, has attended CKD; will re -refer to Dr. Abraham (patients choice) for AV access placement.    7. Anemia in stage 5 chronic kidney disease, not on chronic dialysis - Hgb 10.6 - No indication for AFIA therapy at this time.        My reconciliation of medication should not condone or support use of medications that have been previously prescribed for patients by other providers.  I am only documenting the current medications patients is taking and may change doses, add or discontinue medications based on information provided by the patient.     The patient has been provided with their current level of kidney function including eGFR and creatinine.    We discussed the potential for common complications of CKD including anemia, electrolyte abnormalities, abnormal fluid balance, mineral bone disease and malnutrition.    We discussed strategies to slow the progression of their kidney disease includin. Avoid nephrotoxic agents. Avoid over-the-counter and prescription NSAIDs (Ibuprofren, Motrin, Naproxyn, Aleve, Mobic, Celebrex, Toradol, Advil). All of these can worsen kidney function, elevate BP, cause fluid retention/swelling and elevate potassium. Avoid iodine contrast agents and gadolinium, which can worsen kidney function and/or cause kidney failure. Avoid phosphosoda bowel preps which can worsen kidney function.  2. Work to improve modifiable risk factors. Aim for good control of blood glucose without episodes of hypoglycemia. Notify the provider managing your diabetes if your blood glucose < 60. Aim for good blood pressure control without episodes of hypotension. Call the office if your systolic blood pressure is consistently < 110. Aim for good control of your cholesterol.  ? AIC goal <7.0  ? BP goal <130/80        Keeping these in goal range will help prevent progression of cardiovascular disease            and chronic kidney  disease.    We discussed dietary modifications:  ? Low sodium diet: 2 gm/d or less  ? Limit/avoid high potassium foods  ? Avoid potassium containing salt substitutes  ? Limit/avoid high phosphorus foods  ? Limit daily protein intake to 0.8-1 gm/kg of your ideal body weight.    We discussed lifestyle modifications:  ? Make sure you are drinking plenty of fluids--64 ounces (1/2 gallon) daily  ? Exercise at least 30 minutes 5 x per week (total 150 minutes per week), example brisk walking  ? Achieve and maintain a healthy weight (BMI 20-25)  ? Limit alcohol consumption to <2 drinks per day  ? Stop smoking  ? Make sure you stay current on important vaccines-- pneumonia vaccines (Pneumovax and Prevnar), flu vaccine, Hepatitis B (especially patients nearing renal replacement therapy and planning hemodialysis) and Covid-19 vaccine.     Recommendations:  1. Monitor your BP at home daily and record.  2. Bring readings to your next appt.  3. Call the office if your BP is persistently >130/80.  4. Seek urgent medical attention with signs and symptoms of uremia - extreme weakness, fatigue, confusion, anorexia, metallic taste in mouth, hiccoughs, cramping, itching, chest pain, swelling, or trouble sleeping.    Follow Up:   Follow up in about 10 weeks (around 9/1/2022).

## 2022-07-11 RX ORDER — CALCITRIOL 0.25 UG/1
0.5 CAPSULE ORAL DAILY
Qty: 180 CAPSULE | Refills: 3 | Status: SHIPPED | OUTPATIENT
Start: 2022-07-11 | End: 2022-12-02

## 2022-09-01 PROBLEM — E78.2 HYPERLIPIDEMIA, MIXED: Status: ACTIVE | Noted: 2022-09-01

## 2022-09-01 PROBLEM — Z88.8 ALLERGY TO ACE INHIBITORS: Status: ACTIVE | Noted: 2022-09-01

## 2022-09-01 NOTE — ASSESSMENT & PLAN NOTE
Serum creatinine 4.46 / eGFR 10; Avoid NSAIDS, Assure 64 oz of water daily, Meds per Med list above.     She has attended CKD Smart class, She was referred to Dr. Abraham on 4/8/22 for AV access placement but has not had an appointment still.  Will assure she is seen by Dr. Abraham and have an access placed - has appointment scheduled for 9/8/22 @ 8:30 a.m..  She is asymptomatic at this time without an access.  Will await starting hemodialysis.

## 2022-09-01 NOTE — ASSESSMENT & PLAN NOTE
Hgb 9.5 - Not on FAIA therapy.  Will check iron studies. She has been found to be iron deficient in the past.

## 2022-09-01 NOTE — ASSESSMENT & PLAN NOTE
Not at goal, but reports her home Bps are on average 130/80 with exceptions of occasional spikes up to 150 or 160 systolic.  She has Clonidine to take prn SBP > 180 but has only had to take one dose - Monitor BP twice daily for 2 weeks and submit readings, 2 Gram NA diet, Continue Norvasc 10 mg po daily, Metoprolol 50 mg po daily, Clonidine 0.1 mg prn SBP > 180, Hydralazine is supposed to be 100 mg po TID and she will check her medication bottle when she arrives at home and call back to verify.     Patient verified and called - Hydralazine 100 mg po TID.

## 2022-09-01 NOTE — PROGRESS NOTES
Pt Name:  Mekhi Tinsley  Pt :  1950  Pt MRN:  [id number]    Date: 2022    Reason for visit:   Mekhi Tinsley is a 72 y.o. aa female presenting for CKD follow up with serum creatinine 4.46, eGFR 10 and Chronic Kidney Disease Stage V.     Chief Complaint:   The patient denies any complaints today.    HPI:  The patient is being seen today for follow up CKD and the status of her kidney function. Since the last visit, patient reports/record review reveals she was seen at [facility] ER on 22 for c/o left arm pain. Her BP was elevated and she was treated with Hydralazine 10 mg IV x 2 and morphine 4 mg IV x 1. She was referred for AV access on 22 to Dr. Abraham and has still not been seen.  The patient denies fatigue, weakness, poor appetite, metallic taste, nausea, cramping, chest pain, palpitations, SOB, orthopnea, PND, edema, trouble concentrating, decreased urination.      Home BPs when checked are <130/80 per patient with occasional spikes up to 150 - 160 systolic.  She has Clonidine to take for SBP > 180 and reports she has only had to take one dose.  She didn't bring home meds with her to visit and is unsure of the dose of her Hydralazine.  The Hydralazine was increased last visit. She reports getting stressed out when she comes here because she is afraid she will have to start dialysis.  The patient is following a low sodium diet. The patient is avoiding NSAIDs. The patient is drinking 48 - 64 oz of water daily.     Since last visit on 22 patient reports above noted changes in medical history.      History:   Past Medical History:   Diagnosis Date    Disorder of kidney and ureter     Diverticulosis     DM type 2 (diabetes mellitus, type 2)     DVT (deep vein thrombosis) in pregnancy     HTN (hypertension)     Mild intermittent asthma, uncomplicated     PAD (peripheral artery disease)     S/P IVC filter      Past Surgical History:   Procedure Laterality Date     VENA CAVA FILTER PLACEMENT       COLONOSCOPY      HYSTERECTOMY       Family History   Problem Relation Age of Onset    Hypertension Mother     Hypertension Father     Hypertension Sister     Heart failure Sister     Hypertension Brother      Social History     Substance and Sexual Activity   Alcohol Use Never     Social History     Substance and Sexual Activity   Drug Use Never     Social History     Substance and Sexual Activity   Sexual Activity Never     reports never being sexually active.  Social History     Tobacco Use   Smoking Status Never   Smokeless Tobacco Never       Allergies:  Review of patient's allergies indicates:   Allergen Reactions    Ace inhibitors Swelling    Levofloxacin Other (See Comments)     Dropped fsbs to 50      Irbesartan Nausea Only    Tramadol Nausea Only         Current Outpatient Medications:     amLODIPine (NORVASC) 5 MG tablet, Take 10 mg by mouth once daily., Disp: , Rfl:     atorvastatin (LIPITOR) 20 MG tablet, Take 20 mg by mouth once daily., Disp: , Rfl:     calcitRIOL (ROCALTROL) 0.25 MCG Cap, Take 2 capsules (0.5 mcg total) by mouth once daily., Disp: 180 capsule, Rfl: 3    cloNIDine (CATAPRES) 0.1 MG tablet, Only takes prn if SBP > 180, Disp: , Rfl:     cyanocobalamin (VITAMIN B-12) 100 MCG tablet, Vitamin B12  1 tablet qd, Disp: , Rfl:     fluticasone propionate (FLONASE) 50 mcg/actuation nasal spray, 1 spray as needed., Disp: , Rfl:     gabapentin (NEURONTIN) 100 MG capsule, Take 100 mg by mouth 3 (three) times daily., Disp: , Rfl:     glipiZIDE (GLUCOTROL) 2.5 MG TR24, Take 2.5 mg by mouth once daily at 6am., Disp: , Rfl:     hydrALAZINE (APRESOLINE) 100 MG tablet, Take 100 mg by mouth 3 (three) times daily., Disp: , Rfl:     methocarbamoL (ROBAXIN) 500 MG Tab, Take 500 mg by mouth as needed., Disp: , Rfl:     metoprolol succinate 50 mg CSpX, Take 50 mg by mouth once daily at 6am., Disp: , Rfl:     prednisoLONE acetate (PRED FORTE) 1 % DrpS, prednisolone acetate 1 % eye drops,suspension, Disp: ,  "Rfl:     sodium bicarbonate 325 MG tablet, Take 650 mg by mouth 2 (two) times daily., Disp: , Rfl:     tiZANidine 4 mg Cap, Take 4 mg by mouth as needed., Disp: , Rfl:     PATADAY TWICE DAILY RELIEF 0.1 % ophthalmic solution, Place 1 drop into both eyes once daily., Disp: , Rfl:     ROS:  Review of Systems   Constitutional:  Negative for activity change and appetite change.   HENT:  Negative for hearing loss.    Eyes:  Negative for visual disturbance.   Respiratory:  Negative for shortness of breath and wheezing.    Cardiovascular:  Negative for chest pain.   Gastrointestinal:  Negative for abdominal pain, diarrhea, nausea and vomiting.   Genitourinary:  Negative for decreased urine volume, dysuria, flank pain, frequency and urgency.   Neurological:  Negative for dizziness, weakness and headaches.     Physical Exam:   Vitals:   Vitals:    22 1011 22 1028   BP: (!) 175/56 (!) 169/59   Pulse: (!) 57 (!) 56   Resp: (!) 99    Weight: 84.3 kg (185 lb 12.8 oz)    Height: 5' 6" (1.676 m)      Body mass index is 29.99 kg/m².    Physical Exam  Vitals reviewed. Exam conducted with a chaperone present (daughter).   Constitutional:       General: She is not in acute distress.     Appearance: Normal appearance.   HENT:      Head: Normocephalic and atraumatic.      Mouth/Throat:      Mouth: Mucous membranes are moist.   Eyes:      Extraocular Movements: Extraocular movements intact.      Pupils: Pupils are equal, round, and reactive to light.   Cardiovascular:      Rate and Rhythm: Regular rhythm. Bradycardia present.      Heart sounds: Murmur heard.   Systolic murmur is present with a grade of 2/6.   Pulmonary:      Effort: Pulmonary effort is normal.      Breath sounds: No wheezing, rhonchi or rales.   Musculoskeletal:         General: No swelling.      Right lower le+ Edema present.      Left lower le+ Edema present.   Skin:     General: Skin is warm and dry.   Neurological:      Mental Status: She is alert " and oriented to person, place, and time.   Psychiatric:         Mood and Affect: Mood normal.         Behavior: Behavior normal.       Labs/Tests:  Lab Results   Component Value Date     08/31/2022    K 3.8 08/31/2022     08/31/2022    CO2 24 08/31/2022    BUN 54 (H) 08/31/2022    CREATININE 4.46 (H) 08/31/2022    CREATININE 3.97 (H) 07/26/2022    CREATININE 4.38 (H) 06/22/2022    GLUCOSE 143 (H) 08/31/2022    CALCIUM 9.6 08/31/2022    PHOSPHORUS 4.0 08/31/2022    ALBUMIN 3.8 08/31/2022    EGFRNONAA 9 (L) 08/31/2022    EGFRNONAA 10 (L) 07/26/2022    EGFRNONAA 9 (L) 06/22/2022    ESTGFRAFRICA 10 (L) 08/31/2022    ESTGFRAFRICA 12 (L) 07/26/2022    ESTGFRAFRICA 11 (L) 06/22/2022     (H) 08/31/2022    HGB 9.5 (L) 08/31/2022    IRON 100 02/23/2022    TIBC 213 (L) 02/23/2022    FERRITIN 896.8 (H) 02/23/2022     Component Ref Range & Units 1 d ago   (8/31/22) 2 mo ago   (6/22/22) 4 mo ago   (4/7/22) 7 mo ago   (1/26/22) 11 mo ago   (9/16/21) 1 yr ago   (6/22/21) 1 yr ago   (12/29/20)   Protein, Urine mg/dL mg/dL 267  637  797 R  769 R  258 R  85 R  85 R    Creatinine, Urine mg/dL mg/dL 76.4  79.2  59.0 R  52.2 R  74.0 R  40.3 R  32.3 R    Urine Protein/Creatinine Ratio <0.2 mg of protein/mg of creatinine 3.5 High   8.0 High   13.5 High   14.7 High   3.5 High             Diagnosis:  1. Diabetes mellitus type II, non insulin dependent  Renal Function Panel    Renal Function Panel      2. Essential hypertension  Renal Function Panel    Renal Function Panel      3. Stage 5 chronic kidney disease not on chronic dialysis  Renal Function Panel    PTH, Intact    Protein/Creatinine Ratio, Urine    Renal Function Panel    PTH, Intact    Protein/Creatinine Ratio, Urine      4. Metabolic acidosis  Renal Function Panel    Renal Function Panel      5. Secondary hyperparathyroidism of renal origin  PTH, Intact    PTH, Intact      6. Anemia in stage 5 chronic kidney disease, not on chronic dialysis  Ferritin    Iron and  TIBC    Ferritin    Iron and TIBC    Hemoglobin    Hemoglobin      7. Hyperlipidemia, mixed        8. Allergy to ACE inhibitors        9. Nephrotic range proteinuria  Protein/Creatinine Ratio, Urine    Protein/Creatinine Ratio, Urine      10. Allergy to angiotensin receptor blockers (ARB)             Plan:  Diabetes mellitus type II, non insulin dependent  Control status unknown - continue Glipizide 2.5 mg po daily     Essential hypertension  Not at goal, but reports her home Bps are on average 130/80 with exceptions of occasional spikes up to 150 or 160 systolic.  She has Clonidine to take prn SBP > 180 but has only had to take one dose - Monitor BP twice daily for 2 weeks and submit readings, 2 Gram NA diet, Continue Norvasc 10 mg po daily, Metoprolol 50 mg po daily, Clonidine 0.1 mg prn SBP > 180, Hydralazine is supposed to be 100 mg po TID and she will check her medication bottle when she arrives at home and call back to verify.     Patient verified and called - Hydralazine 100 mg po TID.     Stage 5 chronic kidney disease not on chronic dialysis  Serum creatinine 4.46 / eGFR 10; Avoid NSAIDS, Assure 64 oz of water daily, Meds per Med list above.     She has attended CKD Smart class, She was referred to Dr. Abraham on 4/8/22 for AV access placement but has not had an appointment still.  Will assure she is seen by Dr. Abraham and have an access placed - has appointment scheduled for 9/8/22 @ 8:30 a.m..  She is asymptomatic at this time without an access.  Will await starting hemodialysis.     Nephrotic range proteinuria  3500 mg - down from 8000 mg - allergic to ACE and ARB     Metabolic acidosis  C02 24 - continue NA HC03 650 mg po BID     Secondary hyperparathyroidism of renal origin   - Continue Rocaltrol 0.25 mg - takes 2 po (0.5 mg) po daily     Anemia in stage 5 chronic kidney disease, not on chronic dialysis  Hgb 9.5 - Not on AFIA therapy.  Will check iron studies. She has been found to be iron  deficient in the past.     Hyperlipidemia, mixed  Continue Atorvastatin 20 mg po nightly.          My reconciliation of medication should not condone or support use of medications that have been previously prescribed for patients by other providers.  I am only documenting the current medications patients is taking and may change doses, add or discontinue medications based on information provided by the patient.     The patient has been provided with their current level of kidney function including eGFR and creatinine.    We discussed the potential for common complications of CKD including anemia, electrolyte abnormalities, abnormal fluid balance, mineral bone disease and malnutrition.    We discussed strategies to slow the progression of their kidney disease including:  Avoid nephrotoxic agents. Avoid over-the-counter and prescription NSAIDs (Ibuprofren, Motrin, Naproxyn, Aleve, Mobic, Celebrex, Toradol, Advil). All of these can worsen kidney function, elevate BP, cause fluid retention/swelling and elevate potassium. Avoid iodine contrast agents and gadolinium, which can worsen kidney function and/or cause kidney failure. Avoid phosphosoda bowel preps which can worsen kidney function.  Work to improve modifiable risk factors. Aim for good control of blood glucose without episodes of hypoglycemia. Notify the provider managing your diabetes if your blood glucose < 60. Aim for good blood pressure control without episodes of hypotension. Call the office if your systolic blood pressure is consistently < 110. Aim for good control of your cholesterol.  AIC goal <7.0  BP goal <130/80        Keeping these in goal range will help prevent progression of cardiovascular disease            and chronic kidney disease.    We discussed dietary modifications:  Low sodium diet: 2 gm/d or less  Limit/avoid high potassium foods  Avoid potassium containing salt substitutes  Limit/avoid high phosphorus foods  Limit daily protein intake to  0.8-1 gm/kg of your ideal body weight.    We discussed lifestyle modifications:  Make sure you are drinking plenty of fluids--64 ounces (1/2 gallon) daily  Exercise at least 30 minutes 5 x per week (total 150 minutes per week), example brisk walking  Achieve and maintain a healthy weight (BMI 20-25)  Limit alcohol consumption to <2 drinks per day  Stop smoking  Make sure you stay current on important vaccines-- pneumonia vaccines (Pneumovax and Prevnar), flu vaccine, Hepatitis B (especially patients nearing renal replacement therapy and planning hemodialysis) and Covid-19 vaccine.     Recommendations:  Monitor your BP at home daily and record.  Bring readings to your next appt.  Call the office if your BP is persistently >130/80.  Seek urgent medical attention with signs and symptoms of uremia - extreme weakness, fatigue, confusion, anorexia, metallic taste in mouth, hiccoughs, cramping, itching, chest pain, swelling, or trouble sleeping.    Follow Up:   Follow up in about 4 weeks (around 9/30/2022).

## 2022-09-02 ENCOUNTER — OFFICE VISIT (OUTPATIENT)
Dept: NEPHROLOGY | Facility: CLINIC | Age: 72
End: 2022-09-02
Payer: MEDICARE

## 2022-09-02 VITALS
WEIGHT: 185.81 LBS | BODY MASS INDEX: 29.86 KG/M2 | DIASTOLIC BLOOD PRESSURE: 59 MMHG | SYSTOLIC BLOOD PRESSURE: 169 MMHG | HEIGHT: 66 IN | RESPIRATION RATE: 99 BRPM | HEART RATE: 56 BPM

## 2022-09-02 DIAGNOSIS — D63.1 ANEMIA IN STAGE 5 CHRONIC KIDNEY DISEASE, NOT ON CHRONIC DIALYSIS: ICD-10-CM

## 2022-09-02 DIAGNOSIS — Z88.8 ALLERGY TO ANGIOTENSIN RECEPTOR BLOCKERS (ARB): ICD-10-CM

## 2022-09-02 DIAGNOSIS — E87.20 METABOLIC ACIDOSIS: ICD-10-CM

## 2022-09-02 DIAGNOSIS — E11.9 DIABETES MELLITUS TYPE II, NON INSULIN DEPENDENT: Primary | ICD-10-CM

## 2022-09-02 DIAGNOSIS — Z88.8 ALLERGY TO ACE INHIBITORS: ICD-10-CM

## 2022-09-02 DIAGNOSIS — N18.5 ANEMIA IN STAGE 5 CHRONIC KIDNEY DISEASE, NOT ON CHRONIC DIALYSIS: ICD-10-CM

## 2022-09-02 DIAGNOSIS — N25.81 SECONDARY HYPERPARATHYROIDISM OF RENAL ORIGIN: ICD-10-CM

## 2022-09-02 DIAGNOSIS — I10 ESSENTIAL HYPERTENSION: ICD-10-CM

## 2022-09-02 DIAGNOSIS — E78.2 HYPERLIPIDEMIA, MIXED: ICD-10-CM

## 2022-09-02 DIAGNOSIS — N18.5 STAGE 5 CHRONIC KIDNEY DISEASE NOT ON CHRONIC DIALYSIS: ICD-10-CM

## 2022-09-02 DIAGNOSIS — R80.9 NEPHROTIC RANGE PROTEINURIA: ICD-10-CM

## 2022-09-02 PROCEDURE — 3288F PR FALLS RISK ASSESSMENT DOCUMENTED: ICD-10-PCS | Mod: CPTII,,, | Performed by: NURSE PRACTITIONER

## 2022-09-02 PROCEDURE — 99214 OFFICE O/P EST MOD 30 MIN: CPT | Mod: ,,, | Performed by: NURSE PRACTITIONER

## 2022-09-02 PROCEDURE — 1101F PT FALLS ASSESS-DOCD LE1/YR: CPT | Mod: CPTII,,, | Performed by: NURSE PRACTITIONER

## 2022-09-02 PROCEDURE — 3066F PR DOCUMENTATION OF TREATMENT FOR NEPHROPATHY: ICD-10-PCS | Mod: CPTII,,, | Performed by: NURSE PRACTITIONER

## 2022-09-02 PROCEDURE — 3077F PR MOST RECENT SYSTOLIC BLOOD PRESSURE >= 140 MM HG: ICD-10-PCS | Mod: CPTII,,, | Performed by: NURSE PRACTITIONER

## 2022-09-02 PROCEDURE — 3066F NEPHROPATHY DOC TX: CPT | Mod: CPTII,,, | Performed by: NURSE PRACTITIONER

## 2022-09-02 PROCEDURE — 1101F PR PT FALLS ASSESS DOC 0-1 FALLS W/OUT INJ PAST YR: ICD-10-PCS | Mod: CPTII,,, | Performed by: NURSE PRACTITIONER

## 2022-09-02 PROCEDURE — 1159F MED LIST DOCD IN RCRD: CPT | Mod: CPTII,,, | Performed by: NURSE PRACTITIONER

## 2022-09-02 PROCEDURE — 3008F BODY MASS INDEX DOCD: CPT | Mod: CPTII,,, | Performed by: NURSE PRACTITIONER

## 2022-09-02 PROCEDURE — 3078F PR MOST RECENT DIASTOLIC BLOOD PRESSURE < 80 MM HG: ICD-10-PCS | Mod: CPTII,,, | Performed by: NURSE PRACTITIONER

## 2022-09-02 PROCEDURE — 1160F PR REVIEW ALL MEDS BY PRESCRIBER/CLIN PHARMACIST DOCUMENTED: ICD-10-PCS | Mod: CPTII,,, | Performed by: NURSE PRACTITIONER

## 2022-09-02 PROCEDURE — 3077F SYST BP >= 140 MM HG: CPT | Mod: CPTII,,, | Performed by: NURSE PRACTITIONER

## 2022-09-02 PROCEDURE — 3078F DIAST BP <80 MM HG: CPT | Mod: CPTII,,, | Performed by: NURSE PRACTITIONER

## 2022-09-02 PROCEDURE — 99214 PR OFFICE/OUTPT VISIT, EST, LEVL IV, 30-39 MIN: ICD-10-PCS | Mod: ,,, | Performed by: NURSE PRACTITIONER

## 2022-09-02 PROCEDURE — 3008F PR BODY MASS INDEX (BMI) DOCUMENTED: ICD-10-PCS | Mod: CPTII,,, | Performed by: NURSE PRACTITIONER

## 2022-09-02 PROCEDURE — 3288F FALL RISK ASSESSMENT DOCD: CPT | Mod: CPTII,,, | Performed by: NURSE PRACTITIONER

## 2022-09-02 PROCEDURE — 1160F RVW MEDS BY RX/DR IN RCRD: CPT | Mod: CPTII,,, | Performed by: NURSE PRACTITIONER

## 2022-09-02 PROCEDURE — 1159F PR MEDICATION LIST DOCUMENTED IN MEDICAL RECORD: ICD-10-PCS | Mod: CPTII,,, | Performed by: NURSE PRACTITIONER

## 2022-09-02 RX ORDER — TIZANIDINE HYDROCHLORIDE 4 MG/1
4 CAPSULE, GELATIN COATED ORAL
COMMUNITY

## 2022-09-02 RX ORDER — METHOCARBAMOL 500 MG/1
500 TABLET, FILM COATED ORAL
COMMUNITY
Start: 2022-08-25

## 2022-09-02 RX ORDER — GABAPENTIN 100 MG/1
100 CAPSULE ORAL
COMMUNITY

## 2022-09-02 RX ORDER — HYDRALAZINE HYDROCHLORIDE 100 MG/1
100 TABLET, FILM COATED ORAL EVERY 8 HOURS
COMMUNITY

## 2022-09-02 RX ORDER — CLONIDINE HYDROCHLORIDE 0.1 MG/1
0.1 TABLET ORAL ONCE AS NEEDED
COMMUNITY

## 2022-09-02 RX ORDER — PREDNISOLONE ACETATE 10 MG/ML
SUSPENSION/ DROPS OPHTHALMIC
COMMUNITY
End: 2022-11-04

## 2022-09-02 RX ORDER — OLOPATADINE HYDROCHLORIDE 1 MG/ML
1 SOLUTION OPHTHALMIC DAILY
COMMUNITY
Start: 2022-04-04 | End: 2022-11-04

## 2022-09-08 NOTE — PROGRESS NOTES
Hgb is trending down; now 9.5 with normal iron studies; start AFIA therapy:  Aranesp 40 mcg every 3 weeks.

## 2022-10-20 PROBLEM — I12.9 HYPERTENSION WITH IMPAIRED RENAL FUNCTION: Status: ACTIVE | Noted: 2022-10-20

## 2022-10-20 PROBLEM — E11.21 DIABETIC NEPHROPATHY ASSOCIATED WITH TYPE 2 DIABETES MELLITUS: Status: ACTIVE | Noted: 2022-10-20

## 2022-10-20 NOTE — PROGRESS NOTES
Pt Name:  Mekhi Tinsley  Pt :  1950  Pt MRN:  [id number]    Date: 10/21/2022    Reason for visit:   Mekhi Tinsley is a 72 y.o. aa female presenting for CKD follow up with serum creatinine 5.25, eGFR 9 and Chronic Kidney Disease Stage V.     Chief Complaint:   The patient denies any complaints today.    HPI:  The patient is being seen today for follow up CKD and the status of her kidney function. Since the last visit, patient reports/medical record review reveals she had a Left upper arm cephalic vein transposition AV fistula placed on 22 by Dr. Cortes.  The patient denies fatigue, weakness, poor appetite, metallic taste, nausea, cramping, chest pain, palpitations, SOB, orthopnea, PND, edema, trouble concentrating, decreased urination.      Home BPs when checked are <130-150/80 per patient. The patient is following a low sodium diet. The patient is avoiding NSAIDs. The patient is drinking 48-64 oz of water daily.     Since last visit on 22 patient reports above noted changes in medical history.      History:   Past Medical History:   Diagnosis Date    Disorder of kidney and ureter     Diverticulosis     DM type 2 (diabetes mellitus, type 2)     DVT (deep vein thrombosis) in pregnancy     HTN (hypertension)     Mild intermittent asthma, uncomplicated     PAD (peripheral artery disease)     S/P IVC filter      Past Surgical History:   Procedure Laterality Date     VENA CAVA FILTER PLACEMENT      COLONOSCOPY      DIALYSIS FISTULA CREATION  10/2022    HYSTERECTOMY       Family History   Problem Relation Age of Onset    Hypertension Mother     Hypertension Father     Hypertension Sister     Heart failure Sister     Hypertension Brother      Social History     Substance and Sexual Activity   Alcohol Use Never     Social History     Substance and Sexual Activity   Drug Use Never     Social History     Substance and Sexual Activity   Sexual Activity Never     reports never being sexually  active.  Social History     Tobacco Use   Smoking Status Never   Smokeless Tobacco Never       Allergies:  Review of patient's allergies indicates:   Allergen Reactions    Ace inhibitors Swelling    Levofloxacin Other (See Comments)     Dropped fsbs to 50      Irbesartan Nausea Only    Tramadol Nausea Only         Current Outpatient Medications:     amLODIPine (NORVASC) 5 MG tablet, Take 10 mg by mouth once daily., Disp: , Rfl:     atorvastatin (LIPITOR) 20 MG tablet, Take 20 mg by mouth once daily., Disp: , Rfl:     calcitRIOL (ROCALTROL) 0.25 MCG Cap, Take 2 capsules (0.5 mcg total) by mouth once daily., Disp: 180 capsule, Rfl: 3    cloNIDine (CATAPRES) 0.1 MG tablet, Only takes prn if SBP > 180, Disp: , Rfl:     cyanocobalamin (VITAMIN B-12) 100 MCG tablet, Vitamin B12  1 tablet qd, Disp: , Rfl:     fluticasone propionate (FLONASE) 50 mcg/actuation nasal spray, 1 spray as needed., Disp: , Rfl:     gabapentin (NEURONTIN) 100 MG capsule, Take 100 mg by mouth 3 (three) times daily., Disp: , Rfl:     glipiZIDE (GLUCOTROL) 2.5 MG TR24, Take 2.5 mg by mouth once daily at 6am., Disp: , Rfl:     hydrALAZINE (APRESOLINE) 100 MG tablet, Take 100 mg by mouth 3 (three) times daily., Disp: , Rfl:     methocarbamoL (ROBAXIN) 500 MG Tab, Take 500 mg by mouth as needed., Disp: , Rfl:     metoprolol succinate 50 mg CSpX, Take 50 mg by mouth once daily at 6am., Disp: , Rfl:     PATADAY TWICE DAILY RELIEF 0.1 % ophthalmic solution, Place 1 drop into both eyes once daily., Disp: , Rfl:     prednisoLONE acetate (PRED FORTE) 1 % DrpS, prednisolone acetate 1 % eye drops,suspension, Disp: , Rfl:     sodium bicarbonate 325 MG tablet, Take 650 mg by mouth 2 (two) times daily., Disp: , Rfl:     tiZANidine 4 mg Cap, Take 4 mg by mouth as needed., Disp: , Rfl:     ROS:  Review of Systems   Constitutional:  Negative for activity change and appetite change.   HENT:  Negative for hearing loss.    Eyes:  Negative for visual disturbance.  "  Respiratory:  Negative for shortness of breath and wheezing.    Cardiovascular:  Negative for chest pain.   Gastrointestinal:  Negative for abdominal pain, diarrhea, nausea and vomiting.   Genitourinary:  Negative for decreased urine volume, dysuria, flank pain, frequency and urgency.   Neurological:  Negative for dizziness, weakness and headaches.     Physical Exam:   Vitals:   Vitals:    10/21/22 1048   BP: (!) 164/51   Pulse: (!) 57   SpO2: 99%   Weight: 83.4 kg (183 lb 12.8 oz)   Height: 5' 6" (1.676 m)   PainSc:   2   PainLoc: Back     Body mass index is 29.67 kg/m².    Physical Exam  Vitals reviewed. Exam conducted with a chaperone present (Daughters (two)).   Constitutional:       General: She is not in acute distress.     Appearance: Normal appearance.   HENT:      Head: Normocephalic and atraumatic.      Mouth/Throat:      Mouth: Mucous membranes are moist.   Eyes:      Extraocular Movements: Extraocular movements intact.      Pupils: Pupils are equal, round, and reactive to light.   Cardiovascular:      Rate and Rhythm: Regular rhythm. Bradycardia present.      Heart sounds: Murmur heard.   Systolic murmur is present with a grade of 3/6.      Arteriovenous access: Left arteriovenous access is present.     Comments: Left upper arm AV fistula is patent with good bruit and thrill  Pulmonary:      Effort: Pulmonary effort is normal.      Breath sounds: No wheezing, rhonchi or rales.   Musculoskeletal:         General: No swelling.   Skin:     General: Skin is warm and dry.   Neurological:      Mental Status: She is alert and oriented to person, place, and time.   Psychiatric:         Mood and Affect: Mood normal.         Behavior: Behavior normal.       Labs/Tests:  Lab Results   Component Value Date     10/19/2022    K 3.6 10/19/2022     10/19/2022    CO2 24 10/19/2022    BUN 57 (H) 10/19/2022    CREATININE 5.25 (H) 10/19/2022    CREATININE 4.46 (H) 08/31/2022    CREATININE 3.97 (H) 07/26/2022 "    GLUCOSE 93 10/19/2022    CALCIUM 9.4 10/19/2022    PHOSPHORUS 5.1 10/19/2022    ALBUMIN 3.9 10/19/2022    EGFRNONAA 7 (L) 10/19/2022    EGFRNONAA 9 (L) 08/31/2022    EGFRNONAA 10 (L) 07/26/2022    ESTGFRAFRICA 9 (L) 10/19/2022    ESTGFRAFRICA 10 (L) 08/31/2022    ESTGFRAFRICA 12 (L) 07/26/2022     (H) 10/19/2022    HGB 9.3 (L) 10/10/2022    HGB 8.9 (L) 09/26/2022    HGB 9.5 (L) 08/31/2022    IRON 90 10/19/2022    TIBC 242 (L) 10/19/2022    FERRITIN 840.3 (H) 10/19/2022     Component Ref Range & Units 1 d ago   (10/19/22) 1 mo ago   (8/31/22) 4 mo ago   (6/22/22) 6 mo ago   (4/7/22) 8 mo ago   (1/26/22)   Protein, Urine mg/dL 292  267  637  797  769    Creatinine, Urine mg/dL 124.4  76.4  79.2  59.0  52.2    Urine Protein/Creatinine Ratio <0.2 mg of protein/mg of creatinine 2.3 High   3.5 High   8.0 High   13.5 High   14.7 High       Component Ref Range & Units 1 d ago   (10/19/22) 1 mo ago   (9/2/22) 7 mo ago   (2/23/22) 10 mo ago   (12/15/21) 11 mo ago   (11/17/21)   Iron 50 - 175 ug/dL 90  88  100  48 Low   39 Low     TIBC 250 - 425 ug/dL 242 Low   238 Low   213 Low   225 Low   224 Low     Iron Saturation 15 - 20 % 37 High   37 High   47 High   21 High   17      Component Ref Range & Units 1 d ago   (10/19/22) 1 mo ago   (9/2/22) 7 mo ago   (2/23/22)   Ferritin 7.3 - 270.7 ng/mL 840.3 High   1,007.6 High   896.8 High           Diagnosis:  1. Diabetic nephropathy associated with type 2 diabetes mellitus  Renal Function Panel    Renal Function Panel      2. Hypertension with impaired renal function  Renal Function Panel    Renal Function Panel      3. Chronic kidney disease (CKD), active medical management without dialysis, stage 5  Renal Function Panel    Hepatitis B Surface Antibody, Qual/Quant    Hepatitis B Surface Antigen    Hepatitis C Antibody    Quantiferon Gold TB    Renal Function Panel    Hepatitis B Surface Antibody, Qual/Quant    Hepatitis B Surface Antigen    Hepatitis C Antibody     Quantiferon Gold TB      4. Nephrotic range proteinuria        5. Metabolic acidosis  Renal Function Panel    Renal Function Panel      6. Secondary hyperparathyroidism of renal origin        7. Anemia in stage 5 chronic kidney disease, not on chronic dialysis  Hemoglobin    Hemoglobin      8. Hyperlipidemia, mixed        9. Allergy to ACE inhibitors        10. Allergy to angiotensin receptor blockers (ARB)        11. Screening due  COVID-19 Routine Screening           Plan:  Diabetic nephropathy associated with type 2 diabetes mellitus  Control status unknown - Continue Glucotrol 2.5 mg po daily     Hypertension with impaired renal function  Not At goal, Monitor BP, 2 Gram NA diet, Continue Amlodipine 10 mg po daily, Hydralazine 100 mg po TID, Metoprolol 50 mg po daily, Clonidine 0.1 mg po prn SBP > 180     Chronic kidney disease (CKD), active medical management without dialysis, stage 5  Serum Creatinine 5.25 / eGFR 9; Avoid NSAIDS, Assure 64 oz of water daily, Meds per med list above     She has attended a CKD Smart Class; She has a Left Upper arm AV Fistula - placed 9/23/22; She has been scheduled for f/u with Dr. Cortes on 10/31 for a determination of when the AV access can be used and discuss placement of CVC cath.     She will f/u with me on 11/4 with repeat labs.  She is aware that her eGFR has declined steadily since 2/22 and she is ready to start dialysis.  She is struggling with making the final decision.     Nephrotic range proteinuria  2300 mg - down from 3500 mg     Allergy to ACE / ARB     Metabolic acidosis  C02 24 - Continue NA HC03 650 mg po BID - she has been out of her med and will refill this today     Secondary hyperparathyroidism of renal origin   - Continue Rocaltrol 0.5 mcg po daily     Anemia in stage 5 chronic kidney disease, not on chronic dialysis  Hgb 9.3 - Continue AFIA therapy per procotol     Hyperlipidemia, mixed  Continue Lipitor 20 mg po daily      *Note* At least 20 minutes  was spent with counseling and reviewing her labs, expectations, the need to initiate dialysis and making appointment with Dr. Cortes.       My reconciliation of medication should not condone or support use of medications that have been previously prescribed for patients by other providers.  I am only documenting the current medications patients is taking and may change doses, add or discontinue medications based on information provided by the patient.     The patient has been provided with their current level of kidney function including eGFR and creatinine.    We discussed the potential for common complications of CKD including anemia, electrolyte abnormalities, abnormal fluid balance, mineral bone disease and malnutrition.    We discussed strategies to slow the progression of their kidney disease including:  Avoid nephrotoxic agents. Avoid over-the-counter and prescription NSAIDs (Ibuprofren, Motrin, Naproxyn, Aleve, Mobic, Celebrex, Toradol, Advil). All of these can worsen kidney function, elevate BP, cause fluid retention/swelling and elevate potassium. Avoid iodine contrast agents and gadolinium, which can worsen kidney function and/or cause kidney failure. Avoid phosphosoda bowel preps which can worsen kidney function.  Work to improve modifiable risk factors. Aim for good control of blood glucose without episodes of hypoglycemia. Notify the provider managing your diabetes if your blood glucose < 60. Aim for good blood pressure control without episodes of hypotension. Call the office if your systolic blood pressure is consistently < 110. Aim for good control of your cholesterol.  AIC goal <7.0  BP goal <130/80        Keeping these in goal range will help prevent progression of cardiovascular disease            and chronic kidney disease.    We discussed dietary modifications:  Low sodium diet: 2 gm/d or less  Limit/avoid high potassium foods  Avoid potassium containing salt substitutes  Limit/avoid high  phosphorus foods  Limit daily protein intake to 0.8-1 gm/kg of your ideal body weight.    We discussed lifestyle modifications:  Make sure you are drinking plenty of fluids--64 ounces (1/2 gallon) daily  Exercise at least 30 minutes 5 x per week (total 150 minutes per week), example brisk walking  Achieve and maintain a healthy weight (BMI 20-25)  Limit alcohol consumption to <2 drinks per day  Stop smoking  Make sure you stay current on important vaccines-- pneumonia vaccines (Pneumovax and Prevnar), flu vaccine, Hepatitis B (especially patients nearing renal replacement therapy and planning hemodialysis) and Covid-19 vaccine.     Recommendations:  Monitor your BP at home daily and record.  Bring readings to your next appt.  Call the office if your BP is persistently >130/80.  Seek urgent medical attention with signs and symptoms of uremia - extreme weakness, fatigue, confusion, anorexia, metallic taste in mouth, hiccoughs, cramping, itching, chest pain, swelling, or trouble sleeping.    Follow Up:   Follow up in about 2 weeks (around 11/4/2022).

## 2022-10-20 NOTE — ASSESSMENT & PLAN NOTE
Serum Creatinine 5.25 / eGFR 9; Avoid NSAIDS, Assure 64 oz of water daily, Meds per med list above     She has attended a CKD Smart Class; She has a Left Upper arm AV Fistula - placed 9/23/22; She has been scheduled for f/u with Dr. Cortes on 10/31 for a determination of when the AV access can be used and discuss placement of CVC cath.     She will f/u with me on 11/4 with repeat labs.  She is aware that her eGFR has declined steadily since 2/22 and she is ready to start dialysis.  She is struggling with making the final decision.

## 2022-10-20 NOTE — ASSESSMENT & PLAN NOTE
Not At goal, Monitor BP, 2 Gram NA diet, Continue Amlodipine 10 mg po daily, Hydralazine 100 mg po TID, Metoprolol 50 mg po daily, Clonidine 0.1 mg po prn SBP > 180

## 2022-10-21 ENCOUNTER — OFFICE VISIT (OUTPATIENT)
Dept: NEPHROLOGY | Facility: CLINIC | Age: 72
End: 2022-10-21
Payer: MEDICARE

## 2022-10-21 VITALS
DIASTOLIC BLOOD PRESSURE: 51 MMHG | BODY MASS INDEX: 29.54 KG/M2 | HEART RATE: 57 BPM | SYSTOLIC BLOOD PRESSURE: 164 MMHG | HEIGHT: 66 IN | OXYGEN SATURATION: 99 % | WEIGHT: 183.81 LBS

## 2022-10-21 DIAGNOSIS — Z13.9 SCREENING DUE: ICD-10-CM

## 2022-10-21 DIAGNOSIS — Z88.8 ALLERGY TO ANGIOTENSIN RECEPTOR BLOCKERS (ARB): ICD-10-CM

## 2022-10-21 DIAGNOSIS — E11.21 DIABETIC NEPHROPATHY ASSOCIATED WITH TYPE 2 DIABETES MELLITUS: Primary | ICD-10-CM

## 2022-10-21 DIAGNOSIS — Z88.8 ALLERGY TO ACE INHIBITORS: ICD-10-CM

## 2022-10-21 DIAGNOSIS — E87.20 METABOLIC ACIDOSIS: ICD-10-CM

## 2022-10-21 DIAGNOSIS — D63.1 ANEMIA IN STAGE 5 CHRONIC KIDNEY DISEASE, NOT ON CHRONIC DIALYSIS: ICD-10-CM

## 2022-10-21 DIAGNOSIS — N18.5 ANEMIA IN STAGE 5 CHRONIC KIDNEY DISEASE, NOT ON CHRONIC DIALYSIS: ICD-10-CM

## 2022-10-21 DIAGNOSIS — R80.9 NEPHROTIC RANGE PROTEINURIA: ICD-10-CM

## 2022-10-21 DIAGNOSIS — E78.2 HYPERLIPIDEMIA, MIXED: ICD-10-CM

## 2022-10-21 DIAGNOSIS — N18.5 CHRONIC KIDNEY DISEASE (CKD), ACTIVE MEDICAL MANAGEMENT WITHOUT DIALYSIS, STAGE 5: ICD-10-CM

## 2022-10-21 DIAGNOSIS — N25.81 SECONDARY HYPERPARATHYROIDISM OF RENAL ORIGIN: ICD-10-CM

## 2022-10-21 DIAGNOSIS — I12.9 HYPERTENSION WITH IMPAIRED RENAL FUNCTION: ICD-10-CM

## 2022-10-21 PROCEDURE — 1125F PR PAIN SEVERITY QUANTIFIED, PAIN PRESENT: ICD-10-PCS | Mod: CPTII,,, | Performed by: NURSE PRACTITIONER

## 2022-10-21 PROCEDURE — 3288F PR FALLS RISK ASSESSMENT DOCUMENTED: ICD-10-PCS | Mod: CPTII,,, | Performed by: NURSE PRACTITIONER

## 2022-10-21 PROCEDURE — 3078F PR MOST RECENT DIASTOLIC BLOOD PRESSURE < 80 MM HG: ICD-10-PCS | Mod: CPTII,,, | Performed by: NURSE PRACTITIONER

## 2022-10-21 PROCEDURE — 1101F PT FALLS ASSESS-DOCD LE1/YR: CPT | Mod: CPTII,,, | Performed by: NURSE PRACTITIONER

## 2022-10-21 PROCEDURE — 1101F PR PT FALLS ASSESS DOC 0-1 FALLS W/OUT INJ PAST YR: ICD-10-PCS | Mod: CPTII,,, | Performed by: NURSE PRACTITIONER

## 2022-10-21 PROCEDURE — 1160F PR REVIEW ALL MEDS BY PRESCRIBER/CLIN PHARMACIST DOCUMENTED: ICD-10-PCS | Mod: CPTII,,, | Performed by: NURSE PRACTITIONER

## 2022-10-21 PROCEDURE — 99214 PR OFFICE/OUTPT VISIT, EST, LEVL IV, 30-39 MIN: ICD-10-PCS | Mod: ,,, | Performed by: NURSE PRACTITIONER

## 2022-10-21 PROCEDURE — 1159F MED LIST DOCD IN RCRD: CPT | Mod: CPTII,,, | Performed by: NURSE PRACTITIONER

## 2022-10-21 PROCEDURE — 1159F PR MEDICATION LIST DOCUMENTED IN MEDICAL RECORD: ICD-10-PCS | Mod: CPTII,,, | Performed by: NURSE PRACTITIONER

## 2022-10-21 PROCEDURE — 3077F PR MOST RECENT SYSTOLIC BLOOD PRESSURE >= 140 MM HG: ICD-10-PCS | Mod: CPTII,,, | Performed by: NURSE PRACTITIONER

## 2022-10-21 PROCEDURE — 1125F AMNT PAIN NOTED PAIN PRSNT: CPT | Mod: CPTII,,, | Performed by: NURSE PRACTITIONER

## 2022-10-21 PROCEDURE — 3078F DIAST BP <80 MM HG: CPT | Mod: CPTII,,, | Performed by: NURSE PRACTITIONER

## 2022-10-21 PROCEDURE — 1160F RVW MEDS BY RX/DR IN RCRD: CPT | Mod: CPTII,,, | Performed by: NURSE PRACTITIONER

## 2022-10-21 PROCEDURE — 3288F FALL RISK ASSESSMENT DOCD: CPT | Mod: CPTII,,, | Performed by: NURSE PRACTITIONER

## 2022-10-21 PROCEDURE — 3066F NEPHROPATHY DOC TX: CPT | Mod: CPTII,,, | Performed by: NURSE PRACTITIONER

## 2022-10-21 PROCEDURE — 3066F PR DOCUMENTATION OF TREATMENT FOR NEPHROPATHY: ICD-10-PCS | Mod: CPTII,,, | Performed by: NURSE PRACTITIONER

## 2022-10-21 PROCEDURE — 99214 OFFICE O/P EST MOD 30 MIN: CPT | Mod: ,,, | Performed by: NURSE PRACTITIONER

## 2022-10-21 PROCEDURE — 3077F SYST BP >= 140 MM HG: CPT | Mod: CPTII,,, | Performed by: NURSE PRACTITIONER

## 2022-11-03 NOTE — PROGRESS NOTES
Pt Name:  Mekhi Tinsley  Pt :  1950  Pt MRN:  [id number]    Date: 2022    Reason for visit:   Mekhi Tinsley is a 72 y.o. aa female presenting for CKD follow up with serum creatinine 4.66, eGFR 10 and Chronic Kidney Disease Stage V.     Chief Complaint:   The patient denies any complaints today.  She is accompanied today to her visits by her daughter.     HPI:  The patient is being seen today for follow up CKD and the status of her kidney function. Since the last visit, patient reports she was seen by Dr. Cortes on 10/31 and an AV US was ordered / scheduled for .  Per Dr. Cortes - he will check the results of the US and if no specific problems with US we will plan to proceed with dialysis utilizing the AV fistula if dialysis is needed.  The patient denies fatigue, weakness, poor appetite, metallic taste, nausea, cramping, chest pain, palpitations, SOB, orthopnea, PND, trouble concentrating, decreased urination.  She c/o edema to her ankles.   She reports she has Lasix (40 mg) at home that she has had for over a year now and takes only as needed (not even reported on her med list) and ask about a refill.     Home BPs when checked are running higher than goal; this a.m. her BP was 160/80, she reports it is either at goal of 120-130/80 or occasionally jumps up.   She reports she gets anxious when she is coming for an appointment.  The patient is following a low sodium diet. The patient is avoiding NSAIDs. The patient is drinking 64 oz of water daily.     Since last visit on 10/21/22 patient reports above noted changes in medical history.      History:   Past Medical History:   Diagnosis Date    Disorder of kidney and ureter     Diverticulosis     DM type 2 (diabetes mellitus, type 2)     DVT (deep vein thrombosis) in pregnancy     HTN (hypertension)     Mild intermittent asthma, uncomplicated     PAD (peripheral artery disease)     S/P IVC filter      Past Surgical History:   Procedure  Laterality Date     VENA CAVA FILTER PLACEMENT      COLONOSCOPY      DIALYSIS FISTULA CREATION  10/2022    HYSTERECTOMY       Family History   Problem Relation Age of Onset    Hypertension Mother     Hypertension Father     Hypertension Sister     Heart failure Sister     Hypertension Brother      Social History     Substance and Sexual Activity   Alcohol Use Never     Social History     Substance and Sexual Activity   Drug Use Never     Social History     Substance and Sexual Activity   Sexual Activity Never     reports never being sexually active.  Social History     Tobacco Use   Smoking Status Never   Smokeless Tobacco Never       Allergies:  Review of patient's allergies indicates:   Allergen Reactions    Ace inhibitors Swelling    Levofloxacin Other (See Comments)     Dropped fsbs to 50      Irbesartan Nausea Only    Tramadol Nausea Only         Current Outpatient Medications:     amLODIPine (NORVASC) 5 MG tablet, Take 10 mg by mouth once daily., Disp: , Rfl:     atorvastatin (LIPITOR) 20 MG tablet, Take 20 mg by mouth once daily., Disp: , Rfl:     calcitRIOL (ROCALTROL) 0.25 MCG Cap, Take 2 capsules (0.5 mcg total) by mouth once daily., Disp: 180 capsule, Rfl: 3    cloNIDine (CATAPRES) 0.1 MG tablet, Only takes prn if SBP > 180, Disp: , Rfl:     cyanocobalamin (VITAMIN B-12) 100 MCG tablet, Vitamin B12  1 tablet qd, Disp: , Rfl:     fluticasone propionate (FLONASE) 50 mcg/actuation nasal spray, 1 spray as needed., Disp: , Rfl:     hydrALAZINE (APRESOLINE) 100 MG tablet, Take 100 mg by mouth 3 (three) times daily., Disp: , Rfl:     metoprolol succinate 50 mg CSpX, Take 50 mg by mouth once daily at 6am., Disp: , Rfl:     furosemide (LASIX) 40 MG tablet, Take 1 tablet (40 mg total) by mouth daily as needed (3 - 5 lb weight gain)., Disp: 30 tablet, Rfl: 11    gabapentin (NEURONTIN) 100 MG capsule, Take 100 mg by mouth as needed., Disp: , Rfl:     glipiZIDE (GLUCOTROL) 2.5 MG TR24, Take 1 tablet (2.5 mg total) by  "mouth daily with breakfast., Disp: 30 tablet, Rfl: 0    methocarbamoL (ROBAXIN) 500 MG Tab, Take 500 mg by mouth as needed., Disp: , Rfl:     sodium bicarbonate 325 MG tablet, Take 2 tablets (650 mg total) by mouth 2 (two) times daily., Disp: 180 tablet, Rfl: 3    tiZANidine 4 mg Cap, Take 4 mg by mouth as needed., Disp: , Rfl:     ROS:  Review of Systems   Constitutional:  Negative for activity change and appetite change.   HENT:  Negative for hearing loss.    Eyes:  Negative for visual disturbance.   Respiratory:  Negative for shortness of breath and wheezing.    Cardiovascular:  Positive for leg swelling. Negative for chest pain.   Gastrointestinal:  Negative for abdominal pain, diarrhea, nausea and vomiting.   Genitourinary:  Negative for decreased urine volume, dysuria, flank pain, frequency and urgency.   Neurological:  Negative for dizziness, weakness and headaches.     Physical Exam:   Vitals:   Vitals:    11/04/22 0835   BP: (!) 170/64   Pulse: 61   SpO2: 99%   Weight: 84.4 kg (186 lb)   Height: 5' 6" (1.676 m)     Body mass index is 30.02 kg/m².    Physical Exam  Vitals reviewed. Exam conducted with a chaperone present (daughters).   Constitutional:       General: She is not in acute distress.     Appearance: Normal appearance.   HENT:      Head: Normocephalic and atraumatic.      Mouth/Throat:      Mouth: Mucous membranes are moist.   Eyes:      Extraocular Movements: Extraocular movements intact.      Pupils: Pupils are equal, round, and reactive to light.   Cardiovascular:      Rate and Rhythm: Normal rate and regular rhythm.      Heart sounds: Murmur heard.   Systolic murmur is present with a grade of 3/6.      Arteriovenous access: Left arteriovenous access is present.     Comments: Left upper arm AV access is patent with good bruit and thrill   Pulmonary:      Effort: Pulmonary effort is normal.      Breath sounds: No wheezing, rhonchi or rales.   Musculoskeletal:         General: No swelling.      " Right lower le+ Edema present.      Left lower le+ Edema present.   Skin:     General: Skin is warm and dry.   Neurological:      Mental Status: She is alert and oriented to person, place, and time.   Psychiatric:         Mood and Affect: Mood normal.         Behavior: Behavior normal.       Labs/Tests:  Lab Results   Component Value Date     2022    K 4.0 2022     2022    CO2 22 2022    BUN 61 (H) 2022    CREATININE 4.66 (H) 2022    CREATININE 5.25 (H) 10/19/2022    CREATININE 4.46 (H) 2022    GLUCOSE 122 (H) 2022    CALCIUM 9.6 2022    PHOSPHORUS 4.5 2022    ALBUMIN 4.0 2022    EGFRNONAA 9 (L) 2022    EGFRNONAA 7 (L) 10/19/2022    EGFRNONAA 9 (L) 2022    ESTGFRAFRICA 10 (L) 2022    ESTGFRAFRICA 9 (L) 10/19/2022    ESTGFRAFRICA 10 (L) 2022     (H) 10/19/2022    HGB 9.3 (L) 10/24/2022    HGB 9.3 (L) 10/10/2022    HGB 8.9 (L) 2022    IRON 90 10/19/2022    TIBC 242 (L) 10/19/2022    FERRITIN 840.3 (H) 10/19/2022    HEPBSAG NON-REACTIVE 2022     Component Ref Range & Units 2 wk ago   (10/19/22) 2 mo ago   (22) 4 mo ago   (22) 7 mo ago   (22) 9 mo ago   (22) 1 yr ago   (21) 1 yr ago   (21)   Protein, Urine mg/dL 292  267  637  797  769  258  85    Creatinine, Urine mg/dL 124.4  76.4  79.2  59.0  52.2  74.0  40.3    Urine Protein/Creatinine Ratio <0.2 mg of protein/mg of creatinine 2.3 High   3.5 High   8.0 High   13.5 High   14.7 High   3.5 High   2.1 High           Diagnosis:  1. Diabetic nephropathy associated with type 2 diabetes mellitus  Renal Function Panel    Renal Function Panel      2. Hypertension with impaired renal function  Renal Function Panel    Renal Function Panel      3. Chronic kidney disease (CKD), active medical management without dialysis, stage 5  Hemoglobin    Renal Function Panel    PTH, Intact    Protein/Creatinine Ratio, Urine     Hepatitis B Surface Antibody, Qual/Quant    Hemoglobin    Renal Function Panel    PTH, Intact    Protein/Creatinine Ratio, Urine    Hepatitis B Surface Antibody, Qual/Quant      4. Nephrotic range proteinuria  Protein/Creatinine Ratio, Urine    Protein/Creatinine Ratio, Urine      5. Metabolic acidosis  Renal Function Panel    Renal Function Panel      6. Secondary hyperparathyroidism of renal origin  PTH, Intact    PTH, Intact      7. Anemia in stage 5 chronic kidney disease, not on chronic dialysis  Hemoglobin    Hemoglobin      8. Hyperlipidemia, mixed        9. Allergy to ACE inhibitors        10. Allergy to angiotensin receptor blockers (ARB)        11. Screening due  COVID-19 Routine Screening    COVID-19 Routine Screening           Plan:  Diabetic nephropathy associated with type 2 diabetes mellitus  Control status unknown - Continue Glucotrol 2.5 mg po daily     Hypertension with impaired renal function  Not At goal, Monitor BP, She reports her BP at home is either at goal or jumps up, 2 Gram NA diet, Continue Amlodipine 10 mg po daily, Hydralazine 100 mg po TID, Metoprolol 50 mg po daily, Clonidine 0.1 mg po prn SBP > 180; will also refill her Lasix 40 mg to take prn - based on 3-5 lb weight gain and/or elevated BP.     Chronic kidney disease (CKD), active medical management without dialysis, stage 5  Serum Creatinine 4.66 / eGFR 10; Avoid NSAIDS, Assure 64 oz of water daily, Meds per med list above      She has attended a CKD Smart Class; She has a Left Upper arm AV Fistula - placed 9/23/22; She was seen by Dr. Cortes on 10/31 and an AV US was ordered / scheduled for 11/8.  Per Dr. Cortes - he will check the results of the US and if no specific problems with US we will plan to proceed with dialysis utilizing the AV fistula if dialysis is needed.     She is aware that her eGFR has declined steadily since 2/22 and she is ready to start dialysis.  She is asymptomatic at this time other than mild peripheral  edema.   Will see her back in 4 weeks unless she becomes symptomatic before then.  She has been instructed to call the office should symptoms occur before her appointment.     Nephrotic range proteinuria  2300 mg  -      Allergy to ACE / ARB     Metabolic acidosis  C02 22 - Continue NA HC03 650 mg po BID -    Secondary hyperparathyroidism of renal origin   - Continue Rocaltrol 0.5 mcg po daily     Anemia in stage 5 chronic kidney disease, not on chronic dialysis  Hgb 9.3 - Continue AFIA therapy per procotol     Hyperlipidemia, mixed  Continue Lipitor 20 mg po daily      The patient and her daughters have been made aware that despite not having forthright uremic symptoms she is at the point of needing to start dialysis very soon.  I will see her back in 4 weeks.  She will have completed her vascular US by then.  She has been instructed in the event she has symptoms between now and then she is to call the office and/or go to the ER immediately for further evaluation.  She voices understanding.   Otherwise, I will see her back in 4 weeks and we will plan to proceed with initiating hemodialysis unless her renal function has improved.       My reconciliation of medication should not condone or support use of medications that have been previously prescribed for patients by other providers.  I am only documenting the current medications patients is taking and may change doses, add or discontinue medications based on information provided by the patient.     The patient has been provided with their current level of kidney function including eGFR and creatinine.    We discussed the potential for common complications of CKD including anemia, electrolyte abnormalities, abnormal fluid balance, mineral bone disease and malnutrition.    We discussed strategies to slow the progression of their kidney disease including:  Avoid nephrotoxic agents. Avoid over-the-counter and prescription NSAIDs (Ibuprofren, Motrin, Naproxyn,  Aleve, Mobic, Celebrex, Toradol, Advil). All of these can worsen kidney function, elevate BP, cause fluid retention/swelling and elevate potassium. Avoid iodine contrast agents and gadolinium, which can worsen kidney function and/or cause kidney failure. Avoid phosphosoda bowel preps which can worsen kidney function.  Work to improve modifiable risk factors. Aim for good control of blood glucose without episodes of hypoglycemia. Notify the provider managing your diabetes if your blood glucose < 60. Aim for good blood pressure control without episodes of hypotension. Call the office if your systolic blood pressure is consistently < 110. Aim for good control of your cholesterol.  AIC goal <7.0  BP goal <130/80        Keeping these in goal range will help prevent progression of cardiovascular disease            and chronic kidney disease.    We discussed dietary modifications:  Low sodium diet: 2 gm/d or less  Limit/avoid high potassium foods  Avoid potassium containing salt substitutes  Limit/avoid high phosphorus foods  Limit daily protein intake to 0.8-1 gm/kg of your ideal body weight.    We discussed lifestyle modifications:  Make sure you are drinking plenty of fluids--64 ounces (1/2 gallon) daily  Exercise at least 30 minutes 5 x per week (total 150 minutes per week), example brisk walking  Achieve and maintain a healthy weight (BMI 20-25)  Limit alcohol consumption to <2 drinks per day  Stop smoking  Make sure you stay current on important vaccines-- pneumonia vaccines (Pneumovax and Prevnar), flu vaccine, Hepatitis B (especially patients nearing renal replacement therapy and planning hemodialysis) and Covid-19 vaccine.     Recommendations:  Monitor your BP at home daily and record.  Bring readings to your next appt.  Call the office if your BP is persistently >130/80.  Seek urgent medical attention with signs and symptoms of uremia - extreme weakness, fatigue, confusion, anorexia, metallic taste in mouth,  hiccoughs, cramping, itching, chest pain, swelling, or trouble sleeping.    Follow Up:   Follow up in about 4 weeks (around 12/2/2022).

## 2022-11-03 NOTE — ASSESSMENT & PLAN NOTE
Serum Creatinine 4.66 / eGFR 10; Avoid NSAIDS, Assure 64 oz of water daily, Meds per med list above      She has attended a CKD Smart Class; She has a Left Upper arm AV Fistula - placed 9/23/22; She was seen by Dr. Cortes on 10/31 and an AV US was ordered / scheduled for 11/8.  Per Dr. Cortes - he will check the results of the US and if no specific problems with US we will plan to proceed with dialysis utilizing the AV fistula if dialysis is needed.     She is aware that her eGFR has declined steadily since 2/22 and she is ready to start dialysis.  She is asymptomatic at this time other than mild peripheral edema.   Will see her back in 4 weeks unless she becomes symptomatic before then.  She has been instructed to call the office should symptoms occur before her appointment.

## 2022-11-03 NOTE — ASSESSMENT & PLAN NOTE
Not At goal, Monitor BP, She reports her BP at home is either at goal or jumps up, 2 Gram NA diet, Continue Amlodipine 10 mg po daily, Hydralazine 100 mg po TID, Metoprolol 50 mg po daily, Clonidine 0.1 mg po prn SBP > 180; will also refill her Lasix 40 mg to take prn - based on 3-5 lb weight gain and/or elevated BP.

## 2022-11-04 ENCOUNTER — OFFICE VISIT (OUTPATIENT)
Dept: NEPHROLOGY | Facility: CLINIC | Age: 72
End: 2022-11-04
Payer: MEDICARE

## 2022-11-04 VITALS
DIASTOLIC BLOOD PRESSURE: 64 MMHG | BODY MASS INDEX: 29.89 KG/M2 | OXYGEN SATURATION: 99 % | WEIGHT: 186 LBS | HEIGHT: 66 IN | HEART RATE: 61 BPM | SYSTOLIC BLOOD PRESSURE: 170 MMHG

## 2022-11-04 DIAGNOSIS — E78.2 HYPERLIPIDEMIA, MIXED: ICD-10-CM

## 2022-11-04 DIAGNOSIS — Z88.8 ALLERGY TO ANGIOTENSIN RECEPTOR BLOCKERS (ARB): ICD-10-CM

## 2022-11-04 DIAGNOSIS — Z88.8 ALLERGY TO ACE INHIBITORS: ICD-10-CM

## 2022-11-04 DIAGNOSIS — Z13.9 SCREENING DUE: ICD-10-CM

## 2022-11-04 DIAGNOSIS — I12.9 HYPERTENSION WITH IMPAIRED RENAL FUNCTION: ICD-10-CM

## 2022-11-04 DIAGNOSIS — N18.5 CHRONIC KIDNEY DISEASE (CKD), ACTIVE MEDICAL MANAGEMENT WITHOUT DIALYSIS, STAGE 5: ICD-10-CM

## 2022-11-04 DIAGNOSIS — E87.20 METABOLIC ACIDOSIS: ICD-10-CM

## 2022-11-04 DIAGNOSIS — D63.1 ANEMIA IN STAGE 5 CHRONIC KIDNEY DISEASE, NOT ON CHRONIC DIALYSIS: ICD-10-CM

## 2022-11-04 DIAGNOSIS — N18.5 ANEMIA IN STAGE 5 CHRONIC KIDNEY DISEASE, NOT ON CHRONIC DIALYSIS: ICD-10-CM

## 2022-11-04 DIAGNOSIS — R80.9 NEPHROTIC RANGE PROTEINURIA: ICD-10-CM

## 2022-11-04 DIAGNOSIS — N25.81 SECONDARY HYPERPARATHYROIDISM OF RENAL ORIGIN: ICD-10-CM

## 2022-11-04 DIAGNOSIS — E11.21 DIABETIC NEPHROPATHY ASSOCIATED WITH TYPE 2 DIABETES MELLITUS: Primary | ICD-10-CM

## 2022-11-04 PROCEDURE — 1159F MED LIST DOCD IN RCRD: CPT | Mod: CPTII,,, | Performed by: NURSE PRACTITIONER

## 2022-11-04 PROCEDURE — 3077F PR MOST RECENT SYSTOLIC BLOOD PRESSURE >= 140 MM HG: ICD-10-PCS | Mod: CPTII,,, | Performed by: NURSE PRACTITIONER

## 2022-11-04 PROCEDURE — 3288F PR FALLS RISK ASSESSMENT DOCUMENTED: ICD-10-PCS | Mod: CPTII,,, | Performed by: NURSE PRACTITIONER

## 2022-11-04 PROCEDURE — 3066F PR DOCUMENTATION OF TREATMENT FOR NEPHROPATHY: ICD-10-PCS | Mod: CPTII,,, | Performed by: NURSE PRACTITIONER

## 2022-11-04 PROCEDURE — 1101F PT FALLS ASSESS-DOCD LE1/YR: CPT | Mod: CPTII,,, | Performed by: NURSE PRACTITIONER

## 2022-11-04 PROCEDURE — 99214 OFFICE O/P EST MOD 30 MIN: CPT | Mod: ,,, | Performed by: NURSE PRACTITIONER

## 2022-11-04 PROCEDURE — 3066F NEPHROPATHY DOC TX: CPT | Mod: CPTII,,, | Performed by: NURSE PRACTITIONER

## 2022-11-04 PROCEDURE — 3008F PR BODY MASS INDEX (BMI) DOCUMENTED: ICD-10-PCS | Mod: CPTII,,, | Performed by: NURSE PRACTITIONER

## 2022-11-04 PROCEDURE — 1101F PR PT FALLS ASSESS DOC 0-1 FALLS W/OUT INJ PAST YR: ICD-10-PCS | Mod: CPTII,,, | Performed by: NURSE PRACTITIONER

## 2022-11-04 PROCEDURE — 3078F PR MOST RECENT DIASTOLIC BLOOD PRESSURE < 80 MM HG: ICD-10-PCS | Mod: CPTII,,, | Performed by: NURSE PRACTITIONER

## 2022-11-04 PROCEDURE — 3077F SYST BP >= 140 MM HG: CPT | Mod: CPTII,,, | Performed by: NURSE PRACTITIONER

## 2022-11-04 PROCEDURE — 3078F DIAST BP <80 MM HG: CPT | Mod: CPTII,,, | Performed by: NURSE PRACTITIONER

## 2022-11-04 PROCEDURE — 1159F PR MEDICATION LIST DOCUMENTED IN MEDICAL RECORD: ICD-10-PCS | Mod: CPTII,,, | Performed by: NURSE PRACTITIONER

## 2022-11-04 PROCEDURE — 99214 PR OFFICE/OUTPT VISIT, EST, LEVL IV, 30-39 MIN: ICD-10-PCS | Mod: ,,, | Performed by: NURSE PRACTITIONER

## 2022-11-04 PROCEDURE — 1160F RVW MEDS BY RX/DR IN RCRD: CPT | Mod: CPTII,,, | Performed by: NURSE PRACTITIONER

## 2022-11-04 PROCEDURE — 3008F BODY MASS INDEX DOCD: CPT | Mod: CPTII,,, | Performed by: NURSE PRACTITIONER

## 2022-11-04 PROCEDURE — 1160F PR REVIEW ALL MEDS BY PRESCRIBER/CLIN PHARMACIST DOCUMENTED: ICD-10-PCS | Mod: CPTII,,, | Performed by: NURSE PRACTITIONER

## 2022-11-04 PROCEDURE — 3288F FALL RISK ASSESSMENT DOCD: CPT | Mod: CPTII,,, | Performed by: NURSE PRACTITIONER

## 2022-11-04 RX ORDER — GLIPIZIDE 2.5 MG/1
2.5 TABLET, EXTENDED RELEASE ORAL
Qty: 30 TABLET | Refills: 0 | Status: SHIPPED | OUTPATIENT
Start: 2022-11-04

## 2022-11-04 RX ORDER — SODIUM BICARBONATE 325 MG/1
650 TABLET ORAL 2 TIMES DAILY
Qty: 180 TABLET | Refills: 3 | Status: SHIPPED | OUTPATIENT
Start: 2022-11-04

## 2022-11-04 RX ORDER — FUROSEMIDE 40 MG/1
40 TABLET ORAL DAILY PRN
Qty: 30 TABLET | Refills: 11 | Status: SHIPPED | OUTPATIENT
Start: 2022-11-04 | End: 2023-01-06 | Stop reason: SDUPTHER

## 2022-12-01 DIAGNOSIS — N18.5 CHRONIC KIDNEY DISEASE (CKD), ACTIVE MEDICAL MANAGEMENT WITHOUT DIALYSIS, STAGE 5: Primary | ICD-10-CM

## 2022-12-01 PROBLEM — I10 ESSENTIAL HYPERTENSION: Status: RESOLVED | Noted: 2022-04-08 | Resolved: 2022-12-01

## 2022-12-01 PROBLEM — Z99.2 ARTERIOVENOUS FISTULA FOR HEMODIALYSIS IN PLACE, PRIMARY: Status: ACTIVE | Noted: 2022-12-01

## 2022-12-01 PROBLEM — E11.9 DIABETES MELLITUS TYPE II, NON INSULIN DEPENDENT: Status: RESOLVED | Noted: 2022-04-08 | Resolved: 2022-12-01

## 2022-12-01 PROBLEM — E11.22 TYPE 2 DIABETES MELLITUS WITH STAGE 5 CHRONIC KIDNEY DISEASE NOT ON CHRONIC DIALYSIS, WITHOUT LONG-TERM CURRENT USE OF INSULIN: Status: ACTIVE | Noted: 2022-10-20

## 2022-12-02 ENCOUNTER — TELEPHONE (OUTPATIENT)
Dept: NEPHROLOGY | Facility: CLINIC | Age: 72
End: 2022-12-02

## 2022-12-02 ENCOUNTER — OFFICE VISIT (OUTPATIENT)
Dept: NEPHROLOGY | Facility: CLINIC | Age: 72
End: 2022-12-02
Payer: MEDICARE

## 2022-12-02 ENCOUNTER — DOCUMENTATION ONLY (OUTPATIENT)
Dept: NEPHROLOGY | Facility: CLINIC | Age: 72
End: 2022-12-02

## 2022-12-02 VITALS
DIASTOLIC BLOOD PRESSURE: 64 MMHG | WEIGHT: 182 LBS | BODY MASS INDEX: 29.25 KG/M2 | HEIGHT: 66 IN | SYSTOLIC BLOOD PRESSURE: 143 MMHG

## 2022-12-02 DIAGNOSIS — N18.5 CHRONIC KIDNEY DISEASE (CKD), ACTIVE MEDICAL MANAGEMENT WITHOUT DIALYSIS, STAGE 5: Primary | ICD-10-CM

## 2022-12-02 PROCEDURE — 3077F SYST BP >= 140 MM HG: CPT | Mod: CPTII,95,, | Performed by: NURSE PRACTITIONER

## 2022-12-02 PROCEDURE — 3066F NEPHROPATHY DOC TX: CPT | Mod: CPTII,95,, | Performed by: NURSE PRACTITIONER

## 2022-12-02 PROCEDURE — 1160F PR REVIEW ALL MEDS BY PRESCRIBER/CLIN PHARMACIST DOCUMENTED: ICD-10-PCS | Mod: CPTII,95,, | Performed by: NURSE PRACTITIONER

## 2022-12-02 PROCEDURE — 1101F PT FALLS ASSESS-DOCD LE1/YR: CPT | Mod: CPTII,95,, | Performed by: NURSE PRACTITIONER

## 2022-12-02 PROCEDURE — 3008F PR BODY MASS INDEX (BMI) DOCUMENTED: ICD-10-PCS | Mod: CPTII,95,, | Performed by: NURSE PRACTITIONER

## 2022-12-02 PROCEDURE — 3078F PR MOST RECENT DIASTOLIC BLOOD PRESSURE < 80 MM HG: ICD-10-PCS | Mod: CPTII,95,, | Performed by: NURSE PRACTITIONER

## 2022-12-02 PROCEDURE — 1101F PR PT FALLS ASSESS DOC 0-1 FALLS W/OUT INJ PAST YR: ICD-10-PCS | Mod: CPTII,95,, | Performed by: NURSE PRACTITIONER

## 2022-12-02 PROCEDURE — 3288F PR FALLS RISK ASSESSMENT DOCUMENTED: ICD-10-PCS | Mod: CPTII,95,, | Performed by: NURSE PRACTITIONER

## 2022-12-02 PROCEDURE — 3077F PR MOST RECENT SYSTOLIC BLOOD PRESSURE >= 140 MM HG: ICD-10-PCS | Mod: CPTII,95,, | Performed by: NURSE PRACTITIONER

## 2022-12-02 PROCEDURE — 99211 OFF/OP EST MAY X REQ PHY/QHP: CPT | Mod: 95,,, | Performed by: NURSE PRACTITIONER

## 2022-12-02 PROCEDURE — 3066F PR DOCUMENTATION OF TREATMENT FOR NEPHROPATHY: ICD-10-PCS | Mod: CPTII,95,, | Performed by: NURSE PRACTITIONER

## 2022-12-02 PROCEDURE — 3078F DIAST BP <80 MM HG: CPT | Mod: CPTII,95,, | Performed by: NURSE PRACTITIONER

## 2022-12-02 PROCEDURE — 1159F MED LIST DOCD IN RCRD: CPT | Mod: CPTII,95,, | Performed by: NURSE PRACTITIONER

## 2022-12-02 PROCEDURE — 1160F RVW MEDS BY RX/DR IN RCRD: CPT | Mod: CPTII,95,, | Performed by: NURSE PRACTITIONER

## 2022-12-02 PROCEDURE — 1159F PR MEDICATION LIST DOCUMENTED IN MEDICAL RECORD: ICD-10-PCS | Mod: CPTII,95,, | Performed by: NURSE PRACTITIONER

## 2022-12-02 PROCEDURE — 99211 PR OFFICE/OUTPT VISIT, EST, LEVL I: ICD-10-PCS | Mod: 95,,, | Performed by: NURSE PRACTITIONER

## 2022-12-02 PROCEDURE — 3008F BODY MASS INDEX DOCD: CPT | Mod: CPTII,95,, | Performed by: NURSE PRACTITIONER

## 2022-12-02 PROCEDURE — 3288F FALL RISK ASSESSMENT DOCD: CPT | Mod: CPTII,95,, | Performed by: NURSE PRACTITIONER

## 2022-12-02 RX ORDER — TOBRAMYCIN AND DEXAMETHASONE 3; 1 MG/ML; MG/ML
1 SUSPENSION/ DROPS OPHTHALMIC
COMMUNITY
End: 2023-01-06

## 2022-12-02 RX ORDER — VIT C/E/ZN/COPPR/LUTEIN/ZEAXAN 250MG-90MG
1000 CAPSULE ORAL DAILY
COMMUNITY
Start: 2022-04-13

## 2022-12-02 RX ORDER — HYDROCODONE BITARTRATE AND ACETAMINOPHEN 5; 325 MG/1; MG/1
1 TABLET ORAL DAILY PRN
COMMUNITY

## 2022-12-02 NOTE — TELEPHONE ENCOUNTER
Due to her positive covid test result. Patient was scheduled for 8:00 am in person visit. However, A virtual visit was to be conducted today with Lili at 11:00am. After several attempts to get the Virtual visit set up. I finally spoke with Patient's daughter (lives in [place])  and explained to her the importance of a virtual visit that the patient is critical and will need to be seen today.  The daughter (lives in [place]) wants to r/s her appointment to an in person visit. Per Lili, the patient needs to be seen today virtual visit or taken to the ER to be evaluated. Daughter then states, she would get her to the ER.

## 2022-12-02 NOTE — ASSESSMENT & PLAN NOTE
Serum Creatinine 5.60 / eGFR 7; Avoid NSAIDS, Assure 64 oz of water daily, Meds per med list above      She has attended a CKD Smart Class; She has a Left Upper arm AV Fistula - placed 9/23/22; She was seen by Dr. Cortes on 10/31 and an AV US was ordered / scheduled for 11/8.  Per Dr. Cortes - he will check the results of the US and if no specific problems with US we will plan to proceed with dialysis utilizing the AV fistula if dialysis is needed.

## 2022-12-02 NOTE — PROGRESS NOTES
The patient location is: ***  The chief complaint leading to consultation is: CKD follow up    Visit type: {TELE AUDIOVISUAL:30906}    Face to Face time with patient: ***  *** minutes of total time spent on the encounter, which includes face to face time and non-face to face time preparing to see the patient (eg, review of tests), Obtaining and/or reviewing separately obtained history, Documenting clinical information in the electronic or other health record, Independently interpreting results (not separately reported) and communicating results to the patient/family/caregiver, or Care coordination (not separately reported).         Each patient to whom he or she provides medical services by telemedicine is:  (1) informed of the relationship between the physician and patient and the respective role of any other health care provider with respect to management of the patient; and (2) notified that he or she may decline to receive medical services by telemedicine and may withdraw from such care at any time.    Notes:       Pt Name:  Mekhi Tinsley  Pt :  1950  Pt MRN:  [id number]    Date: 2022    Reason for visit:   Mekhi Tinsley is a 72 y.o. aa female presenting for CKD follow up with serum creatinine 5.60, eGFR 7 and Chronic Kidney Disease Stage V.     Chief Complaint:   The patient denies any complaints today.    HPI:  The patient is being seen today for follow up CKD and the status of her kidney function. Since the last visit, patient reports ***.  The patient denies fatigue, weakness, poor appetite, metallic taste, nausea, cramping, chest pain, palpitations, SOB, orthopnea, PND, edema, trouble concentrating, decreased urination.      Home BPs when checked are <130/80 per patient. The patient is following a low sodium diet. The patient is avoiding NSAIDs. The patient is drinking *** oz of water daily.     Since last visit on 22 patient reports *** changes in medical history.      History:    Past Medical History:   Diagnosis Date    Diabetes mellitus type II, non insulin dependent 4/8/2022    Disorder of kidney and ureter     Diverticulosis     DM type 2 (diabetes mellitus, type 2)     DVT (deep vein thrombosis) in pregnancy     Essential hypertension 4/8/2022    HTN (hypertension)     Mild intermittent asthma, uncomplicated     PAD (peripheral artery disease)     S/P IVC filter      Past Surgical History:   Procedure Laterality Date     VENA CAVA FILTER PLACEMENT      COLONOSCOPY      DIALYSIS FISTULA CREATION  10/2022    HYSTERECTOMY       Family History   Problem Relation Age of Onset    Hypertension Mother     Hypertension Father     Hypertension Sister     Heart failure Sister     Hypertension Brother      Social History     Substance and Sexual Activity   Alcohol Use Never     Social History     Substance and Sexual Activity   Drug Use Never     Social History     Substance and Sexual Activity   Sexual Activity Never     reports never being sexually active.  Social History     Tobacco Use   Smoking Status Never   Smokeless Tobacco Never       Allergies:  Review of patient's allergies indicates:   Allergen Reactions    Ace inhibitors Swelling    Levofloxacin Other (See Comments)     Dropped fsbs to 50      Irbesartan Nausea Only    Tramadol Nausea Only         Current Outpatient Medications:     amLODIPine (NORVASC) 5 MG tablet, Take 10 mg by mouth once daily., Disp: , Rfl:     atorvastatin (LIPITOR) 20 MG tablet, Take 20 mg by mouth once daily., Disp: , Rfl:     calcitRIOL (ROCALTROL) 0.25 MCG Cap, Take 2 capsules (0.5 mcg total) by mouth once daily., Disp: 180 capsule, Rfl: 3    cloNIDine (CATAPRES) 0.1 MG tablet, Only takes prn if SBP > 180, Disp: , Rfl:     cyanocobalamin (VITAMIN B-12) 100 MCG tablet, Vitamin B12  1 tablet qd, Disp: , Rfl:     fluticasone propionate (FLONASE) 50 mcg/actuation nasal spray, 1 spray as needed., Disp: , Rfl:     furosemide (LASIX) 40 MG tablet, Take 1 tablet (40  mg total) by mouth daily as needed (3 - 5 lb weight gain)., Disp: 30 tablet, Rfl: 11    gabapentin (NEURONTIN) 100 MG capsule, Take 100 mg by mouth as needed., Disp: , Rfl:     glipiZIDE (GLUCOTROL) 2.5 MG TR24, Take 1 tablet (2.5 mg total) by mouth daily with breakfast., Disp: 30 tablet, Rfl: 0    hydrALAZINE (APRESOLINE) 100 MG tablet, Take 100 mg by mouth 3 (three) times daily., Disp: , Rfl:     methocarbamoL (ROBAXIN) 500 MG Tab, Take 500 mg by mouth as needed., Disp: , Rfl:     metoprolol succinate 50 mg CSpX, Take 50 mg by mouth once daily at 6am., Disp: , Rfl:     sodium bicarbonate 325 MG tablet, Take 2 tablets (650 mg total) by mouth 2 (two) times daily., Disp: 180 tablet, Rfl: 3    tiZANidine 4 mg Cap, Take 4 mg by mouth as needed., Disp: , Rfl:     ROS:  Review of Systems    Physical Exam:   Vitals:   There were no vitals filed for this visit.  There is no height or weight on file to calculate BMI.    Physical Exam      Labs/Tests:  12/1/22:  Contains abnormal data RENAL FUNCTION PANEL   Ref Range & Units 1 d ago   Sodium 136 - 147 mmol/L 139    Potassium 3.5 - 5.1 mmol/L 3.8    Chloride 98 - 107 mmol/L 102    CO2 20 - 31 mmol/L 27    Glucose 70 - 99 mg/dL 88    BUN 9 - 23 mg/dL 66 High     Creatinine 0.55 - 1.02 mg/dL 5.60 High     Calcium 8.3 - 10.6 mg/dL 9.8    Phosphorus Level 2.4 - 5.1 mg/dL 4.5    Albumin Level 3.2 - 4.8 g/dL 4.2    Anion Gap 5.0 - 15.0 mmol/L 9.8    eGFR CKD-EPI >90 ml/min/1.73m2 7 Low       Component   Ref Range & Units 1 d ago 1 mo ago 3 mo ago 5 mo ago 7 mo ago 10 mo ago 1 yr ago     PTH, Intact   15 - 65 pg/mL 101 High   126 High   118 High   209 High   448 High   318 High   236 High       Component   Ref Range & Units 1 d ago   (12/1/22) 3 d ago   (11/29/22) 3 wk ago   (11/7/22) 1 mo ago   (10/24/22) 1 mo ago   (10/10/22) 2 mo ago   (9/26/22) 3 mo ago   (8/31/22)     Hemoglobin   11.3 - 15.4 g/dL 9.1 Low   8.3 Low   8.8 Low   9.3 Low   9.3 Low   8.9 Low   9.5 Low             Component   Ref Range & Units 1 d ago   (12/1/22) 4 wk ago   (11/3/22) 2 yr ago   (10/5/20) 2 yr ago   (7/23/20)    SARS CoV-2 PCR Overall Interpretation   Not detected Detected Abnormal   Not detected  Negative R  Negative      Component   Ref Range & Units 1 d ago      Hepatitis B Surface AB Quant   >=10.0 39.8      Component   Ref Range & Units 1 d ago      Hep B Core Total Ab   NON-REACTIVE REACTIVE Abnormal       Component   Ref Range & Units 4 wk ago 2 yr ago     Hepatitis B Surface Ag   NON-REACTIVE NON-REACTIVE  NON-REACTIVE       Component   Ref Range & Units 4 wk ago     TB Gold Plus Interp   Negative Negative     TB1 minus NIL   <0.35 IU/mL 0.01     TB2 minus NIL   <0.35 IU/mL 0.03     Mitogen minus NIL   >0.49 IU/mL >9.95     NIL   <8.1 mL 0.05        Lab Results   Component Value Date    UPROTCREA 3.1 (H) 12/01/2022    UPROTCREA 2.3 (H) 10/19/2022    UPROTCREA 3.5 (H) 08/31/2022    EXTIRONSATUR 37 (H) 10/19/2022    EXTIRONSATUR 37 (H) 09/02/2022    EXTIRONSATUR 47 (H) 02/23/2022           Diagnosis:  Plan and Assessment:  1. Type 2 diabetes mellitus with stage 5 chronic kidney disease not on chronic dialysis, without long-term current use of insulin  Assessment & Plan:  Control status unknown - Continue Glucotrol 2.5 mg po daily       2. Hypertension with impaired renal function  Assessment & Plan:  At goal, Monitor BP, Monitor BP, 2 Gram NA diet, Continue Amlodipine 10 mg po daily, Hydralazine 100 mg po TID, Metoprolol 50 mg po daily, Clonidine 0.1 mg po prn SBP > 180;  Lasix 40 mg to take prn - based on 3-5 lb weight gain and/or elevated BP.       3. Chronic kidney disease (CKD), active medical management without dialysis, stage 5  Overview:  Left upper arm cephalic vein transposition AV fistula on 9/23/22 - by Dr. Cortes     Assessment & Plan:  Serum Creatinine 5.60 / eGFR 7; Avoid NSAIDS, Assure 64 oz of water daily, Meds per med list above      She has attended a CKD Smart Class; She has a Left  Upper arm AV Fistula - placed 9/23/22; She was seen by Dr. Cortes on 10/31 and an AV US was ordered / scheduled for 11/8.  Per Dr. Cortes - he will check the results of the US and if no specific problems with US we will plan to proceed with dialysis utilizing the AV fistula if dialysis is needed.           4. Nephrotic range proteinuria  Assessment & Plan:  3100 mg - up from 2300 mg -       5. Metabolic acidosis  Assessment & Plan:  C02 27 Up from C02 22 - Continue NA HC03 650 mg po BID -      6. Secondary hyperparathyroidism of renal origin  Assessment & Plan:   - Down from 126 - Continue Rocaltrol 0.5 mcg po daily       7. Anemia in stage 5 chronic kidney disease, not on chronic dialysis  Assessment & Plan:  Hgb 9.1 - Continue AFIA therapy per procotol       8. Hyperlipidemia, mixed  Assessment & Plan:  Continue Lipitor 20 mg po daily       9. Allergy to ACE inhibitors  Assessment & Plan:  Allergy to ACE       10. Allergy to angiotensin receptor blockers (ARB)  Assessment & Plan:  Allergy to ARB            My reconciliation of medication should not condone or support use of medications that have been previously prescribed for patients by other providers.  I am only documenting the current medications patients is taking and may change doses, add or discontinue medications based on information provided by the patient.     The patient has been provided with their current level of kidney function including eGFR and creatinine.    We discussed the potential for common complications of CKD including anemia, electrolyte abnormalities, abnormal fluid balance, mineral bone disease and malnutrition.    We discussed strategies to slow the progression of their kidney disease including:  Avoid nephrotoxic agents. Avoid over-the-counter and prescription NSAIDs (Ibuprofren, Motrin, Naproxyn, Aleve, Mobic, Celebrex, Toradol, Advil). All of these can worsen kidney function, elevate BP, cause fluid retention/swelling and  elevate potassium. Avoid iodine contrast agents and gadolinium, which can worsen kidney function and/or cause kidney failure. Avoid phosphosoda bowel preps which can worsen kidney function.  Work to improve modifiable risk factors. Aim for good control of blood glucose without episodes of hypoglycemia. Notify the provider managing your diabetes if your blood glucose < 60. Aim for good blood pressure control without episodes of hypotension. Call the office if your systolic blood pressure is consistently < 110. Aim for good control of your cholesterol.  AIC goal <7.0  BP goal <130/80        Keeping these in goal range will help prevent progression of cardiovascular disease            and chronic kidney disease.    We discussed dietary modifications:  Low sodium diet: 2 gm/d or less  Limit/avoid high potassium foods  Avoid potassium containing salt substitutes  Limit/avoid high phosphorus foods  Limit daily protein intake to 0.8-1 gm/kg of your ideal body weight.    We discussed lifestyle modifications:  Make sure you are drinking plenty of fluids--64 ounces (1/2 gallon) daily  Exercise at least 30 minutes 5 x per week (total 150 minutes per week), example brisk walking  Achieve and maintain a healthy weight (BMI 20-25)  Limit alcohol consumption to <2 drinks per day  Stop smoking  Make sure you stay current on important vaccines-- pneumonia vaccines (Pneumovax and Prevnar), flu vaccine, Hepatitis B (especially patients nearing renal replacement therapy and planning hemodialysis) and Covid-19 vaccine.     Recommendations:  Monitor your BP at home daily and record.  Bring readings to your next appt.  Call the office if your BP is persistently >130/80.  Seek urgent medical attention with signs and symptoms of uremia - extreme weakness, fatigue, confusion, anorexia, metallic taste in mouth, hiccoughs, cramping, itching, chest pain, swelling, or trouble sleeping.    Follow Up:   No follow-ups on file.

## 2022-12-02 NOTE — ASSESSMENT & PLAN NOTE
At goal, Monitor BP, Monitor BP, 2 Gram NA diet, Continue Amlodipine 10 mg po daily, Hydralazine 100 mg po TID, Metoprolol 50 mg po daily, Clonidine 0.1 mg po prn SBP > 180;  Lasix 40 mg to take prn - based on 3-5 lb weight gain and/or elevated BP.

## 2022-12-06 ENCOUNTER — TELEPHONE (OUTPATIENT)
Dept: NEPHROLOGY | Facility: CLINIC | Age: 72
End: 2022-12-06

## 2022-12-06 DIAGNOSIS — N18.5 CHRONIC KIDNEY DISEASE (CKD), ACTIVE MEDICAL MANAGEMENT WITHOUT DIALYSIS, STAGE 5: Primary | ICD-10-CM

## 2022-12-06 NOTE — TELEPHONE ENCOUNTER
12/5/2022 3:00pm - Lili called me to see if her daughter could bring patient in at 1pm, 2pm , or 3pm prefer 1pm 12/6/2022.  Rocio ( patient) access got a hold of her daughter. Justine declined an afternoon appointment. Patient's daughter said they could come but it would have to be in the morning. While on the phone with the daughter- Isatu called Lili to see what time they could come on 12/6. Per Lili 11:00am. Appointment was scheduled and made by Jackie. Justine called the call center shortly after the appointment was already made and stated that she couldn't come at that time and wanted to r/s. The call was then transferred to Isatu.

## 2022-12-06 NOTE — TELEPHONE ENCOUNTER
"12/05/22--Patients daughter Justine was contacted by Lizette DEE @ 3:30 (Patient access) and asked if she could bring her mother in for a follow- up appt on Tuesday, 12/06/22 at 11:00 in regards to CKD stage 5 and to her missed appointment due to patient being Covid positive on Thursday 12/1/2022 and was unable to make her appt on 12/02/22 and unable to do a virtual visit.   Patient's daughter Justine told Lizette they could bring her on Tuesday and appt slot was created and made by Jackie Santiago.  Patient's daughter (Justine) then called the call center @ 4:20 and got Lisandra Redding (patient access) and stated she could not bring her mom at that time, so the called was transferred to Isatu Connolly R.N. and I asked her if she could bring her Thursday 12/08/222 or Friday 12/09/22 at 7:45 for her mom to see Lili and they stated they would bring her Friday morning and appt was made. Once again notifying Lili of new appt time.  This time since it will be 1 week since labs were done Lili asked for patient to repeat labs on Thursday 12/8/2022 for her appt on 12/09/2022 so I called back to Justine and let her know her mom will need new labs and to either bring the negative COVID test from [facility] or she would need another COVID test for admission to [facility].  Justine stated "she was at work and could not be on the phone with us anymore" and hung up the phone..    Lili was notified and asked to put new orders in for Mrs. Tinsley appt on Friday Morning.   "

## 2022-12-06 NOTE — PROGRESS NOTES
The patient tested positive for Covid on labs prior to office visit.  OV was changed to a telehealth visit and the patients daughter went through the process of preparing her smart phone for the visit.  However, after the nursing portion of the visit was completed the daughter left the home of the patient and went to [place].  Due to this the telehealth visit was unable to be completed.  Telephone calls were made to the patients home and to the cell numbers on file with no answers.     As of Friday, 12/2/22, at 5:20 p.m. the patient could not be reached.  A message was left on her home phone.

## 2022-12-08 NOTE — PROGRESS NOTES
Pt Name:  Mekhi Tinsley  Pt :  1950  Pt MRN:  [id number]    Date: 2022    Reason for visit:   Mekhi Tinsley is a 72 y.o. aa female presenting for CKD follow up with serum creatinine 5.46, eGFR 8 and Chronic Kidney Disease Stage V.     Chief Complaint:   The patient denies any complaints today.    HPI:  The patient is being seen today for follow up CKD and the status of her kidney function. Since the last visit, patient reports no health changes.  The patient denies fatigue, weakness, poor appetite, metallic taste, nausea, cramping, chest pain, palpitations, SOB, orthopnea, PND, edema, trouble concentrating, decreased urination.      Home BPs when checked are <130-150/80 per patient. The patient is following a low sodium diet. The patient is avoiding NSAIDs. The patient is drinking 64 oz of water daily.     Since last visit on 22 patient reports no changes in medical history.      History:   Past Medical History:   Diagnosis Date    Diabetes mellitus type II, non insulin dependent 2022    Disorder of kidney and ureter     Diverticulosis     DM type 2 (diabetes mellitus, type 2)     DVT (deep vein thrombosis) in pregnancy     Essential hypertension 2022    HTN (hypertension)     Mild intermittent asthma, uncomplicated     PAD (peripheral artery disease)     S/P IVC filter      Past Surgical History:   Procedure Laterality Date     VENA CAVA FILTER PLACEMENT      COLONOSCOPY      DIALYSIS FISTULA CREATION Left 10/2022    HYSTERECTOMY       Family History   Problem Relation Age of Onset    Hypertension Mother     Hypertension Father     Hypertension Sister     Heart failure Sister     Hypertension Brother      Social History     Substance and Sexual Activity   Alcohol Use Never     Social History     Substance and Sexual Activity   Drug Use Yes    Types: Hydrocodone     Social History     Substance and Sexual Activity   Sexual Activity Never     reports never being sexually  active.  Social History     Tobacco Use   Smoking Status Never   Smokeless Tobacco Never       Allergies:  Review of patient's allergies indicates:   Allergen Reactions    Ace inhibitors Swelling    Levofloxacin Other (See Comments)     Dropped fsbs to 50      Irbesartan Nausea Only    Tramadol Nausea Only         Current Outpatient Medications:     amLODIPine (NORVASC) 5 MG tablet, Take 10 mg by mouth once daily. Take 2 pills 2 times a day, Disp: , Rfl:     atorvastatin (LIPITOR) 20 MG tablet, Take 20 mg by mouth once daily., Disp: , Rfl:     calcitRIOL (ROCALTROL) 0.25 MCG Cap, Take 0.25 mcg by mouth once daily., Disp: , Rfl:     cholecalciferol, vitamin D3, (VITAMIN D3) 25 mcg (1,000 unit) capsule, 1,000 Units Daily., Disp: , Rfl:     cloNIDine (CATAPRES) 0.1 MG tablet, Take 0.1 mg by mouth 1 (one) time if needed. Only takes prn if SBP > 180, Disp: , Rfl:     cyanocobalamin (VITAMIN B-12) 100 MCG tablet, Take 100 mcg by mouth once daily., Disp: , Rfl:     fluticasone propionate (FLONASE) 50 mcg/actuation nasal spray, 2 sprays as needed., Disp: , Rfl:     furosemide (LASIX) 40 MG tablet, Take 1 tablet (40 mg total) by mouth daily as needed (3 - 5 lb weight gain). (Patient taking differently: Take 40 mg by mouth once daily.), Disp: 30 tablet, Rfl: 11    glipiZIDE (GLUCOTROL) 2.5 MG TR24, Take 1 tablet (2.5 mg total) by mouth daily with breakfast. (Patient taking differently: Take 2.5 mg by mouth Daily.), Disp: 30 tablet, Rfl: 0    hydrALAZINE (APRESOLINE) 100 MG tablet, Take 100 mg by mouth every 8 (eight) hours., Disp: , Rfl:     HYDROcodone-acetaminophen (NORCO) 5-325 mg per tablet, daily as needed., Disp: , Rfl:     methocarbamoL (ROBAXIN) 500 MG Tab, Take 500 mg by mouth as needed., Disp: , Rfl:     metoprolol succinate 50 mg CSpX, Take 50 mg by mouth once daily at 6am., Disp: , Rfl:     sodium bicarbonate 325 MG tablet, Take 2 tablets (650 mg total) by mouth 2 (two) times daily. (Patient taking differently:  "Take 650 mg by mouth 2 (two) times daily. Take 2 tablets 2 times a day), Disp: 180 tablet, Rfl: 3    tiZANidine 4 mg Cap, Take 4 mg by mouth as needed., Disp: , Rfl:     tobramycin-dexAMETHasone 0.3-0.1% (TOBRADEX) 0.3-0.1 % DrpS, as needed., Disp: , Rfl:     gabapentin (NEURONTIN) 100 MG capsule, Take 100 mg by mouth as needed., Disp: , Rfl:     ROS:  Review of Systems   Constitutional:  Negative for activity change and appetite change.   HENT:  Negative for hearing loss.    Eyes:  Negative for visual disturbance.   Respiratory:  Negative for shortness of breath and wheezing.    Cardiovascular:  Negative for chest pain.   Gastrointestinal:  Negative for abdominal pain, diarrhea, nausea and vomiting.   Genitourinary:  Negative for decreased urine volume, dysuria, flank pain, frequency and urgency.   Neurological:  Negative for dizziness, weakness and headaches.     Physical Exam:   Vitals:   Vitals:    12/09/22 0745   BP: (!) 177/53   Pulse: (!) 55   SpO2: 99%   Weight: 84.4 kg (186 lb)   Height: 5' 6" (1.676 m)     Body mass index is 30.02 kg/m².    Physical Exam  Vitals reviewed. Exam conducted with a chaperone present (Daughters - Present Duane Fletcher and Gregory (by phone)).   Constitutional:       General: She is not in acute distress.     Appearance: Normal appearance.   HENT:      Head: Normocephalic and atraumatic.      Mouth/Throat:      Mouth: Mucous membranes are moist.   Eyes:      Extraocular Movements: Extraocular movements intact.      Pupils: Pupils are equal, round, and reactive to light.   Cardiovascular:      Rate and Rhythm: Normal rate and regular rhythm.      Heart sounds: Murmur heard.   Systolic murmur is present with a grade of 2/6.      Arteriovenous access: Left arteriovenous access is present.     Comments: Left AV Upper arm Fistula is patent with good bruit and thrill   Pulmonary:      Effort: Pulmonary effort is normal.      Breath sounds: No wheezing, rhonchi or rales. "   Musculoskeletal:         General: No swelling.      Right lower le+ Pitting Edema present.      Left lower le+ Pitting Edema present.      Comments: Uses rolling walker for ambulatory assistance    Skin:     General: Skin is warm and dry.   Neurological:      Mental Status: She is alert and oriented to person, place, and time.   Psychiatric:         Mood and Affect: Mood normal.         Behavior: Behavior normal.       Labs/Tests:    22 RENAL FUNCTION PANEL     Ref Range & Units 10:59   Sodium 136 - 147 mmol/L 138    Potassium 3.5 - 5.1 mmol/L 3.7    Chloride 98 - 107 mmol/L 103    CO2 20 - 31 mmol/L 25    Glucose 70 - 99 mg/dL 99    BUN 9 - 23 mg/dL 57 High     Creatinine 0.55 - 1.02 mg/dL 5.46 High     Calcium 8.3 - 10.6 mg/dL 9.4    Phosphorus Level 2.4 - 5.1 mg/dL 4.4    Albumin Level 3.2 - 4.8 g/dL 4.0    Anion Gap 5.0 - 15.0 mmol/L 9.9    eGFR CKD-EPI >90 ml/min/1.73m2 8 Low       Component Ref Range & Units 10:59   (22) 7 d ago   (22) 9 d ago   (22) 1 mo ago   (22) 1 mo ago   (10/24/22) 1 mo ago   (10/10/22) 2 mo ago   (22)   Hemoglobin 11.3 - 15.4 g/dL 8.2 Low   9.1 Low   8.3 Low   8.8 Low   9.3 Low   9.3 Low       Lab Results   Component Value Date     2022    K 4.0 2022     2022    CO2 22 2022    BUN 61 (H) 2022    CREATININE 4.66 (H) 2022    CREATININE 5.25 (H) 10/19/2022    CREATININE 4.46 (H) 2022    GLUCOSE 122 (H) 2022    CALCIUM 9.6 2022    PHOSPHORUS 4.5 2022    ALBUMIN 4.0 2022    EGFRNONAA 9 (L) 2022    EGFRNONAA 7 (L) 10/19/2022    EGFRNONAA 9 (L) 2022    ESTGFRAFRICA 10 (L) 2022    ESTGFRAFRICA 9 (L) 10/19/2022    ESTGFRAFRICA 10 (L) 2022     (H) 2022    HGB 8.2 (L) 2022    HGB 9.1 (L) 2022    HGB 8.3 (L) 2022    IRON 90 10/19/2022    TIBC 242 (L) 10/19/2022    FERRITIN 840.3 (H) 10/19/2022    HEPBSAG NON-REACTIVE 2022        Lab Results   Component Value Date    UPROTCREA 3.1 (H) 12/01/2022    UPROTCREA 2.3 (H) 10/19/2022    UPROTCREA 3.5 (H) 08/31/2022    EXTIRONSATUR 37 (H) 10/19/2022    EXTIRONSATUR 37 (H) 09/02/2022    EXTIRONSATUR 47 (H) 02/23/2022         Diagnosis:  Plan and Assessment:  1. Type 2 diabetes mellitus with stage 5 chronic kidney disease not on chronic dialysis, without long-term current use of insulin  Assessment & Plan:  Control status unknown - Continue Glucotrol 2.5 mg po daily     Orders:  -     Renal Function Panel; Future; Expected date: 12/23/2022  -     Protein/Creatinine Ratio, Urine; Future; Expected date: 12/23/2022    2. Hypertension with impaired renal function  Assessment & Plan:  Not At goal, Monitor BP, Monitor BP, 2 Gram NA diet, Continue Amlodipine 10 mg po daily, Hydralazine 100 mg po TID, Metoprolol 50 mg po daily, Clonidine 0.1 mg po prn SBP > 180;  Lasix 40 mg to take prn - based on 3-5 lb weight gain and/or elevated BP.   She reports she has not taken the Clonidine but she has taken the Lasix this past week.     Orders:  -     Renal Function Panel; Future; Expected date: 12/23/2022    3. Chronic kidney disease (CKD), active medical management without dialysis, stage 5  Overview:  Left upper arm cephalic vein transposition AV fistula on 9/23/22 - by Dr. Cortes     Assessment & Plan:  Serum Creatinine 5.46 / eGFR 8; Avoid NSAIDS, Assure 64 oz of water daily, Meds per med list above      She has attended a CKD Smart Class; She has a Left Upper arm AV Fistula - placed 9/23/22; She was seen by Dr. Cortes on 10/31 and an AV US was ordered / scheduled for 11/8.  Per Dr. Cortes - he will check the results of the US and if no specific problems with US we will plan to proceed with dialysis utilizing the AV fistula if dialysis is needed.       Have discussed with the patient and her family that she is at the point of needing to start dialysis.  The family is reluctant to start.  I have asked if  they would like a referral to an outside nephrologist for a second opinion and also offered an appointment with one of our local Nephrologist.   They prefer an outside nephrologist.   The family has requested a referral to Dr. Robe Talamantes in [place].     Hands out provided on Stages of CKD and NKF Heat Map. Also provided hand outs on ESRD / Dialysis and Chronic Kidney Disease.     Orders:  -     Ambulatory referral/consult to Nephrology; Future; Expected date: 12/16/2022  -     Hemoglobin; Future; Expected date: 12/23/2022  -     Vitamin D; Future; Expected date: 12/23/2022  -     Renal Function Panel; Future; Expected date: 12/23/2022  -     PTH, Intact; Future; Expected date: 12/23/2022  -     Protein/Creatinine Ratio, Urine; Future; Expected date: 12/23/2022  -     COVID-19 Routine Screening; Future; Expected date: 12/09/2022    4. Arteriovenous fistula for hemodialysis in place, primary  Overview:  Left upper arm cephalic vein transposition AV fistula on 9/23/22 - by Dr. Cortes     Assessment & Plan:  Left AV Fistula is patent with good bruit and thrill       5. Nephrotic range proteinuria  Assessment & Plan:  3100 mg - up from 2300 mg -     Orders:  -     Protein/Creatinine Ratio, Urine; Future; Expected date: 12/23/2022    6. Metabolic acidosis  Assessment & Plan:  C02 25 Up from C02 22 - Continue NA HC03 650 mg po BID -    Orders:  -     Renal Function Panel; Future; Expected date: 12/23/2022    7. Secondary hyperparathyroidism of renal origin  Assessment & Plan:   - Down from 126 - Decrease Rocaltrol 25 mcg po daily     Orders:  -     PTH, Intact; Future; Expected date: 12/23/2022    8. Anemia in stage 5 chronic kidney disease, not on chronic dialysis  Assessment & Plan:  Hgb 8.2 - Continue AFIA therapy per procotol     Orders:  -     Hemoglobin; Future; Expected date: 12/23/2022    9. Hyperlipidemia, mixed  Assessment & Plan:  Continue Lipitor 20 mg po daily       Other orders  -     epoetin  elis-epbx injection 4,000 Units         My reconciliation of medication should not condone or support use of medications that have been previously prescribed for patients by other providers.  I am only documenting the current medications patients is taking and may change doses, add or discontinue medications based on information provided by the patient.     The patient has been provided with their current level of kidney function including eGFR and creatinine.    We discussed the potential for common complications of CKD including anemia, electrolyte abnormalities, abnormal fluid balance, mineral bone disease and malnutrition.    We discussed strategies to slow the progression of their kidney disease including:  Avoid nephrotoxic agents. Avoid over-the-counter and prescription NSAIDs (Ibuprofren, Motrin, Naproxyn, Aleve, Mobic, Celebrex, Toradol, Advil). All of these can worsen kidney function, elevate BP, cause fluid retention/swelling and elevate potassium. Avoid iodine contrast agents and gadolinium, which can worsen kidney function and/or cause kidney failure. Avoid phosphosoda bowel preps which can worsen kidney function.  Work to improve modifiable risk factors. Aim for good control of blood glucose without episodes of hypoglycemia. Notify the provider managing your diabetes if your blood glucose < 60. Aim for good blood pressure control without episodes of hypotension. Call the office if your systolic blood pressure is consistently < 110. Aim for good control of your cholesterol.  AIC goal <7.0  BP goal <130/80        Keeping these in goal range will help prevent progression of cardiovascular disease            and chronic kidney disease.    We discussed dietary modifications:  Low sodium diet: 2 gm/d or less  Limit/avoid high potassium foods  Avoid potassium containing salt substitutes  Limit/avoid high phosphorus foods  Limit daily protein intake to 0.8-1 gm/kg of your ideal body weight.    We discussed  lifestyle modifications:  Make sure you are drinking plenty of fluids--64 ounces (1/2 gallon) daily  Exercise at least 30 minutes 5 x per week (total 150 minutes per week), example brisk walking  Achieve and maintain a healthy weight (BMI 20-25)  Limit alcohol consumption to <2 drinks per day  Stop smoking  Make sure you stay current on important vaccines-- pneumonia vaccines (Pneumovax and Prevnar), flu vaccine, Hepatitis B (especially patients nearing renal replacement therapy and planning hemodialysis) and Covid-19 vaccine.     Recommendations:  Monitor your BP at home daily and record.  Bring readings to your next appt.  Call the office if your BP is persistently >130/80.  Seek urgent medical attention with signs and symptoms of uremia - extreme weakness, fatigue, confusion, anorexia, metallic taste in mouth, hiccoughs, cramping, itching, chest pain, swelling, or trouble sleeping.    Follow Up:   Follow up in about 2 weeks (around 12/23/2022).

## 2022-12-08 NOTE — ASSESSMENT & PLAN NOTE
Serum Creatinine 5.46 / eGFR 8; Avoid NSAIDS, Assure 64 oz of water daily, Meds per med list above      She has attended a CKD Smart Class; She has a Left Upper arm AV Fistula - placed 9/23/22; She was seen by Dr. Cortes on 10/31 and an AV US was ordered / scheduled for 11/8.  Per Dr. Cortes - he will check the results of the US and if no specific problems with US we will plan to proceed with dialysis utilizing the AV fistula if dialysis is needed.       Have discussed with the patient and her family that she is at the point of needing to start dialysis.  The family is reluctant to start.  I have asked if they would like a referral to an outside nephrologist for a second opinion and also offered an appointment with one of our local Nephrologist.   They prefer an outside nephrologist.   The family has requested a referral to Dr. Robe Talamantes in [place].     Hands out provided on Stages of CKD and NKF Heat Map. Also provided hand outs on ESRD / Dialysis and Chronic Kidney Disease.

## 2022-12-08 NOTE — ASSESSMENT & PLAN NOTE
Not At goal, Monitor BP, Monitor BP, 2 Gram NA diet, Continue Amlodipine 10 mg po daily, Hydralazine 100 mg po TID, Metoprolol 50 mg po daily, Clonidine 0.1 mg po prn SBP > 180;  Lasix 40 mg to take prn - based on 3-5 lb weight gain and/or elevated BP.   She reports she has not taken the Clonidine but she has taken the Lasix this past week.

## 2022-12-09 ENCOUNTER — OFFICE VISIT (OUTPATIENT)
Dept: NEPHROLOGY | Facility: CLINIC | Age: 72
End: 2022-12-09
Payer: MEDICARE

## 2022-12-09 VITALS
WEIGHT: 186 LBS | DIASTOLIC BLOOD PRESSURE: 53 MMHG | BODY MASS INDEX: 29.89 KG/M2 | SYSTOLIC BLOOD PRESSURE: 177 MMHG | HEIGHT: 66 IN | HEART RATE: 55 BPM | OXYGEN SATURATION: 99 %

## 2022-12-09 DIAGNOSIS — E11.22 TYPE 2 DIABETES MELLITUS WITH STAGE 5 CHRONIC KIDNEY DISEASE NOT ON CHRONIC DIALYSIS, WITHOUT LONG-TERM CURRENT USE OF INSULIN: ICD-10-CM

## 2022-12-09 DIAGNOSIS — Z99.2 ARTERIOVENOUS FISTULA FOR HEMODIALYSIS IN PLACE, PRIMARY: ICD-10-CM

## 2022-12-09 DIAGNOSIS — N18.5 ANEMIA IN STAGE 5 CHRONIC KIDNEY DISEASE, NOT ON CHRONIC DIALYSIS: ICD-10-CM

## 2022-12-09 DIAGNOSIS — N25.81 SECONDARY HYPERPARATHYROIDISM OF RENAL ORIGIN: ICD-10-CM

## 2022-12-09 DIAGNOSIS — R80.9 NEPHROTIC RANGE PROTEINURIA: ICD-10-CM

## 2022-12-09 DIAGNOSIS — N18.5 TYPE 2 DIABETES MELLITUS WITH STAGE 5 CHRONIC KIDNEY DISEASE NOT ON CHRONIC DIALYSIS, WITHOUT LONG-TERM CURRENT USE OF INSULIN: ICD-10-CM

## 2022-12-09 DIAGNOSIS — N18.5 CHRONIC KIDNEY DISEASE (CKD), ACTIVE MEDICAL MANAGEMENT WITHOUT DIALYSIS, STAGE 5: ICD-10-CM

## 2022-12-09 DIAGNOSIS — D63.1 ANEMIA IN STAGE 5 CHRONIC KIDNEY DISEASE, NOT ON CHRONIC DIALYSIS: ICD-10-CM

## 2022-12-09 DIAGNOSIS — E78.2 HYPERLIPIDEMIA, MIXED: ICD-10-CM

## 2022-12-09 DIAGNOSIS — I12.9 HYPERTENSION WITH IMPAIRED RENAL FUNCTION: ICD-10-CM

## 2022-12-09 DIAGNOSIS — E87.20 METABOLIC ACIDOSIS: ICD-10-CM

## 2022-12-09 PROCEDURE — 1160F PR REVIEW ALL MEDS BY PRESCRIBER/CLIN PHARMACIST DOCUMENTED: ICD-10-PCS | Mod: CPTII,,, | Performed by: NURSE PRACTITIONER

## 2022-12-09 PROCEDURE — 1101F PT FALLS ASSESS-DOCD LE1/YR: CPT | Mod: CPTII,,, | Performed by: NURSE PRACTITIONER

## 2022-12-09 PROCEDURE — 1159F PR MEDICATION LIST DOCUMENTED IN MEDICAL RECORD: ICD-10-PCS | Mod: CPTII,,, | Performed by: NURSE PRACTITIONER

## 2022-12-09 PROCEDURE — 1101F PR PT FALLS ASSESS DOC 0-1 FALLS W/OUT INJ PAST YR: ICD-10-PCS | Mod: CPTII,,, | Performed by: NURSE PRACTITIONER

## 2022-12-09 PROCEDURE — 3288F PR FALLS RISK ASSESSMENT DOCUMENTED: ICD-10-PCS | Mod: CPTII,,, | Performed by: NURSE PRACTITIONER

## 2022-12-09 PROCEDURE — 3077F PR MOST RECENT SYSTOLIC BLOOD PRESSURE >= 140 MM HG: ICD-10-PCS | Mod: CPTII,,, | Performed by: NURSE PRACTITIONER

## 2022-12-09 PROCEDURE — 3078F DIAST BP <80 MM HG: CPT | Mod: CPTII,,, | Performed by: NURSE PRACTITIONER

## 2022-12-09 PROCEDURE — 99214 PR OFFICE/OUTPT VISIT, EST, LEVL IV, 30-39 MIN: ICD-10-PCS | Mod: ,,, | Performed by: NURSE PRACTITIONER

## 2022-12-09 PROCEDURE — 3066F NEPHROPATHY DOC TX: CPT | Mod: CPTII,,, | Performed by: NURSE PRACTITIONER

## 2022-12-09 PROCEDURE — 3008F BODY MASS INDEX DOCD: CPT | Mod: CPTII,,, | Performed by: NURSE PRACTITIONER

## 2022-12-09 PROCEDURE — 3288F FALL RISK ASSESSMENT DOCD: CPT | Mod: CPTII,,, | Performed by: NURSE PRACTITIONER

## 2022-12-09 PROCEDURE — 3078F PR MOST RECENT DIASTOLIC BLOOD PRESSURE < 80 MM HG: ICD-10-PCS | Mod: CPTII,,, | Performed by: NURSE PRACTITIONER

## 2022-12-09 PROCEDURE — 3077F SYST BP >= 140 MM HG: CPT | Mod: CPTII,,, | Performed by: NURSE PRACTITIONER

## 2022-12-09 PROCEDURE — 1160F RVW MEDS BY RX/DR IN RCRD: CPT | Mod: CPTII,,, | Performed by: NURSE PRACTITIONER

## 2022-12-09 PROCEDURE — 99214 OFFICE O/P EST MOD 30 MIN: CPT | Mod: ,,, | Performed by: NURSE PRACTITIONER

## 2022-12-09 PROCEDURE — 3008F PR BODY MASS INDEX (BMI) DOCUMENTED: ICD-10-PCS | Mod: CPTII,,, | Performed by: NURSE PRACTITIONER

## 2022-12-09 PROCEDURE — 3066F PR DOCUMENTATION OF TREATMENT FOR NEPHROPATHY: ICD-10-PCS | Mod: CPTII,,, | Performed by: NURSE PRACTITIONER

## 2022-12-09 PROCEDURE — 1159F MED LIST DOCD IN RCRD: CPT | Mod: CPTII,,, | Performed by: NURSE PRACTITIONER

## 2022-12-09 RX ORDER — CALCITRIOL 0.25 UG/1
0.25 CAPSULE ORAL
COMMUNITY
Start: 2022-01-27

## 2022-12-23 ENCOUNTER — TELEPHONE (OUTPATIENT)
Dept: NEPHROLOGY | Facility: CLINIC | Age: 72
End: 2022-12-23

## 2022-12-23 ENCOUNTER — OFFICE VISIT (OUTPATIENT)
Dept: NEPHROLOGY | Facility: CLINIC | Age: 72
End: 2022-12-23
Payer: MEDICARE

## 2022-12-23 VITALS
SYSTOLIC BLOOD PRESSURE: 164 MMHG | DIASTOLIC BLOOD PRESSURE: 68 MMHG | BODY MASS INDEX: 29.25 KG/M2 | HEIGHT: 66 IN | WEIGHT: 182 LBS

## 2022-12-23 DIAGNOSIS — I12.9 HYPERTENSION WITH IMPAIRED RENAL FUNCTION: ICD-10-CM

## 2022-12-23 DIAGNOSIS — D63.1 ANEMIA IN STAGE 5 CHRONIC KIDNEY DISEASE, NOT ON CHRONIC DIALYSIS: Primary | ICD-10-CM

## 2022-12-23 DIAGNOSIS — N18.5 CHRONIC KIDNEY DISEASE (CKD), ACTIVE MEDICAL MANAGEMENT WITHOUT DIALYSIS, STAGE 5: ICD-10-CM

## 2022-12-23 DIAGNOSIS — N18.5 ANEMIA IN STAGE 5 CHRONIC KIDNEY DISEASE, NOT ON CHRONIC DIALYSIS: Primary | ICD-10-CM

## 2022-12-23 DIAGNOSIS — R80.9 NEPHROTIC RANGE PROTEINURIA: ICD-10-CM

## 2022-12-23 DIAGNOSIS — D63.1 ANEMIA IN STAGE 5 CHRONIC KIDNEY DISEASE, NOT ON CHRONIC DIALYSIS: ICD-10-CM

## 2022-12-23 DIAGNOSIS — E11.22 TYPE 2 DIABETES MELLITUS WITH STAGE 5 CHRONIC KIDNEY DISEASE NOT ON CHRONIC DIALYSIS, WITHOUT LONG-TERM CURRENT USE OF INSULIN: ICD-10-CM

## 2022-12-23 DIAGNOSIS — E78.2 HYPERLIPIDEMIA, MIXED: ICD-10-CM

## 2022-12-23 DIAGNOSIS — N18.5 TYPE 2 DIABETES MELLITUS WITH STAGE 5 CHRONIC KIDNEY DISEASE NOT ON CHRONIC DIALYSIS, WITHOUT LONG-TERM CURRENT USE OF INSULIN: ICD-10-CM

## 2022-12-23 DIAGNOSIS — N25.81 SECONDARY HYPERPARATHYROIDISM OF RENAL ORIGIN: ICD-10-CM

## 2022-12-23 DIAGNOSIS — Z99.2 ARTERIOVENOUS FISTULA FOR HEMODIALYSIS IN PLACE, PRIMARY: ICD-10-CM

## 2022-12-23 DIAGNOSIS — N18.5 ANEMIA IN STAGE 5 CHRONIC KIDNEY DISEASE, NOT ON CHRONIC DIALYSIS: ICD-10-CM

## 2022-12-23 DIAGNOSIS — Z88.8 ALLERGY TO ANGIOTENSIN RECEPTOR BLOCKERS (ARB): ICD-10-CM

## 2022-12-23 DIAGNOSIS — E87.20 METABOLIC ACIDOSIS: ICD-10-CM

## 2022-12-23 DIAGNOSIS — Z88.8 ALLERGY TO ACE INHIBITORS: ICD-10-CM

## 2022-12-23 PROCEDURE — 3077F PR MOST RECENT SYSTOLIC BLOOD PRESSURE >= 140 MM HG: ICD-10-PCS | Mod: CPTII,95,, | Performed by: NURSE PRACTITIONER

## 2022-12-23 PROCEDURE — 99214 OFFICE O/P EST MOD 30 MIN: CPT | Mod: 95,,, | Performed by: NURSE PRACTITIONER

## 2022-12-23 PROCEDURE — 3288F FALL RISK ASSESSMENT DOCD: CPT | Mod: CPTII,95,, | Performed by: NURSE PRACTITIONER

## 2022-12-23 PROCEDURE — 1159F PR MEDICATION LIST DOCUMENTED IN MEDICAL RECORD: ICD-10-PCS | Mod: CPTII,95,, | Performed by: NURSE PRACTITIONER

## 2022-12-23 PROCEDURE — 3066F NEPHROPATHY DOC TX: CPT | Mod: CPTII,95,, | Performed by: NURSE PRACTITIONER

## 2022-12-23 PROCEDURE — 3008F BODY MASS INDEX DOCD: CPT | Mod: CPTII,95,, | Performed by: NURSE PRACTITIONER

## 2022-12-23 PROCEDURE — 3066F PR DOCUMENTATION OF TREATMENT FOR NEPHROPATHY: ICD-10-PCS | Mod: CPTII,95,, | Performed by: NURSE PRACTITIONER

## 2022-12-23 PROCEDURE — 3078F PR MOST RECENT DIASTOLIC BLOOD PRESSURE < 80 MM HG: ICD-10-PCS | Mod: CPTII,95,, | Performed by: NURSE PRACTITIONER

## 2022-12-23 PROCEDURE — 1160F RVW MEDS BY RX/DR IN RCRD: CPT | Mod: CPTII,95,, | Performed by: NURSE PRACTITIONER

## 2022-12-23 PROCEDURE — 1159F MED LIST DOCD IN RCRD: CPT | Mod: CPTII,95,, | Performed by: NURSE PRACTITIONER

## 2022-12-23 PROCEDURE — 1160F PR REVIEW ALL MEDS BY PRESCRIBER/CLIN PHARMACIST DOCUMENTED: ICD-10-PCS | Mod: CPTII,95,, | Performed by: NURSE PRACTITIONER

## 2022-12-23 PROCEDURE — 1101F PR PT FALLS ASSESS DOC 0-1 FALLS W/OUT INJ PAST YR: ICD-10-PCS | Mod: CPTII,95,, | Performed by: NURSE PRACTITIONER

## 2022-12-23 PROCEDURE — 1101F PT FALLS ASSESS-DOCD LE1/YR: CPT | Mod: CPTII,95,, | Performed by: NURSE PRACTITIONER

## 2022-12-23 PROCEDURE — 3288F PR FALLS RISK ASSESSMENT DOCUMENTED: ICD-10-PCS | Mod: CPTII,95,, | Performed by: NURSE PRACTITIONER

## 2022-12-23 PROCEDURE — 3008F PR BODY MASS INDEX (BMI) DOCUMENTED: ICD-10-PCS | Mod: CPTII,95,, | Performed by: NURSE PRACTITIONER

## 2022-12-23 PROCEDURE — 99214 PR OFFICE/OUTPT VISIT, EST, LEVL IV, 30-39 MIN: ICD-10-PCS | Mod: 95,,, | Performed by: NURSE PRACTITIONER

## 2022-12-23 PROCEDURE — 3077F SYST BP >= 140 MM HG: CPT | Mod: CPTII,95,, | Performed by: NURSE PRACTITIONER

## 2022-12-23 PROCEDURE — 3078F DIAST BP <80 MM HG: CPT | Mod: CPTII,95,, | Performed by: NURSE PRACTITIONER

## 2022-12-23 NOTE — TELEPHONE ENCOUNTER
Patient was scheduled at 8:00am and  told to be here at 7:45am. Patient called 8:04am after the clinic opened to ask to do a Telehealth instead of coming into the clinic. Lili agreed. I walked Pt and her daughters through the steps to get set up. I gave patient and her daughters time to get set up-called to check on the patient because lili said she still has not seen anything on mychart from them to start the telehealth visit. Patient states, she had to change her password. I told Pt ok, I will call you back in a few minutes to give you time to change your password. After about 10 min I called the patient back and no one answered was not able to leave a vm.

## 2022-12-23 NOTE — PROGRESS NOTES
The patient location is: in [place] at her home  The chief complaint leading to consultation is: CKD follow up     Visit type: audiovisual    Face to Face time with patient: 17  17 minutes of total time spent on the encounter, which includes face to face time and non-face to face time preparing to see the patient (eg, review of tests), Obtaining and/or reviewing separately obtained history, Documenting clinical information in the electronic or other health record, Independently interpreting results (not separately reported) and communicating results to the patient/family/caregiver, or Care coordination (not separately reported).         Each patient to whom he or she provides medical services by telemedicine is:  (1) informed of the relationship between the physician and patient and the respective role of any other health care provider with respect to management of the patient; and (2) notified that he or she may decline to receive medical services by telemedicine and may withdraw from such care at any time.    Notes:       Pt Name:  Mekhi Tinsley  Pt :  1950  Pt MRN:  [id number]    Date: 2022    Reason for visit:   Mekhi Tinsley is a 72 y.o. 71 y/o  presenting for CKD follow up with serum creatinine 4.79, eGFR 9 and Chronic Kidney Disease Stage V.     Chief Complaint:   The patient denies any complaints today.    HPI:  The patient is being seen today for follow up CKD and the status of her kidney function. Since the last visit, patient reports no changes in health.  The patient denies fatigue, weakness, poor appetite, metallic taste, nausea, cramping, chest pain, palpitations, SOB, orthopnea, PND, edema, trouble concentrating, decreased urination.      Home BPs when checked are <130/80 per patient. The patient is following a low sodium diet. The patient is avoiding NSAIDs. The patient is drinking 60 oz of water daily.     Since last visit on 22 patient reports no changes in medical  history.      History:   Past Medical History:   Diagnosis Date    Diabetes mellitus type II, non insulin dependent 4/8/2022    Disorder of kidney and ureter     Diverticulosis     DM type 2 (diabetes mellitus, type 2)     DVT (deep vein thrombosis) in pregnancy     Essential hypertension 4/8/2022    HTN (hypertension)     Mild intermittent asthma, uncomplicated     PAD (peripheral artery disease)     S/P IVC filter      Past Surgical History:   Procedure Laterality Date     VENA CAVA FILTER PLACEMENT      COLONOSCOPY      DIALYSIS FISTULA CREATION Left 10/2022    HYSTERECTOMY       Family History   Problem Relation Age of Onset    Hypertension Mother     Hypertension Father     Hypertension Sister     Heart failure Sister     Hypertension Brother      Social History     Substance and Sexual Activity   Alcohol Use Never     Social History     Substance and Sexual Activity   Drug Use Yes    Types: Hydrocodone     Social History     Substance and Sexual Activity   Sexual Activity Never     reports never being sexually active.  Social History     Tobacco Use   Smoking Status Never   Smokeless Tobacco Never       Allergies:  Review of patient's allergies indicates:   Allergen Reactions    Ace inhibitors Swelling    Levofloxacin Other (See Comments)     Dropped fsbs to 50      Irbesartan Nausea Only    Tramadol Nausea Only         Current Outpatient Medications:     amLODIPine (NORVASC) 5 MG tablet, Take 10 mg by mouth once daily. Take 2 pills 2 times a day, Disp: , Rfl:     atorvastatin (LIPITOR) 20 MG tablet, Take 20 mg by mouth once daily., Disp: , Rfl:     calcitRIOL (ROCALTROL) 0.25 MCG Cap, Take 0.25 mcg by mouth every Mon, Wed, Fri., Disp: , Rfl:     cholecalciferol, vitamin D3, (VITAMIN D3) 25 mcg (1,000 unit) capsule, Take 1,000 Units by mouth once daily., Disp: , Rfl:     cloNIDine (CATAPRES) 0.1 MG tablet, Take 0.1 mg by mouth 1 (one) time if needed. Only takes prn if SBP > 180, Disp: , Rfl:     cyanocobalamin  (VITAMIN B-12) 100 MCG tablet, Take 100 mcg by mouth once daily., Disp: , Rfl:     fluticasone propionate (FLONASE) 50 mcg/actuation nasal spray, 2 sprays as needed., Disp: , Rfl:     furosemide (LASIX) 40 MG tablet, Take 1 tablet (40 mg total) by mouth daily as needed (3 - 5 lb weight gain). (Patient taking differently: Take 40 mg by mouth once daily.), Disp: 30 tablet, Rfl: 11    gabapentin (NEURONTIN) 100 MG capsule, Take 100 mg by mouth as needed., Disp: , Rfl:     glipiZIDE (GLUCOTROL) 2.5 MG TR24, Take 1 tablet (2.5 mg total) by mouth daily with breakfast., Disp: 30 tablet, Rfl: 0    hydrALAZINE (APRESOLINE) 100 MG tablet, Take 100 mg by mouth every 8 (eight) hours., Disp: , Rfl:     HYDROcodone-acetaminophen (NORCO) 5-325 mg per tablet, Take 1 tablet by mouth daily as needed., Disp: , Rfl:     methocarbamoL (ROBAXIN) 500 MG Tab, Take 500 mg by mouth as needed., Disp: , Rfl:     metoprolol succinate 50 mg CSpX, Take 50 mg by mouth once daily at 6am., Disp: , Rfl:     sodium bicarbonate 325 MG tablet, Take 2 tablets (650 mg total) by mouth 2 (two) times daily. (Patient taking differently: Take 650 mg by mouth 2 (two) times daily. Take 2 tablets 2 times a day), Disp: 180 tablet, Rfl: 3    tiZANidine 4 mg Cap, Take 4 mg by mouth as needed., Disp: , Rfl:     tobramycin-dexAMETHasone 0.3-0.1% (TOBRADEX) 0.3-0.1 % DrpS, Place 1 drop into both eyes as needed., Disp: , Rfl:     ROS:   Review of Systems   Constitutional:  Negative for activity change and appetite change.   HENT:  Negative for hearing loss.    Eyes:  Negative for visual disturbance.   Respiratory:  Negative for shortness of breath and wheezing.    Cardiovascular:  Positive for leg swelling. Negative for chest pain.   Gastrointestinal:  Negative for abdominal pain, diarrhea, nausea and vomiting.   Genitourinary:  Negative for decreased urine volume, dysuria, flank pain, frequency and urgency.   Neurological:  Negative for dizziness, weakness and  "headaches.     Physical Exam:   Vitals:   Vitals:    12/23/22 0825   BP: (!) 164/68   Weight: 82.6 kg (182 lb)   Height: 5' 6" (1.676 m)     Body mass index is 29.38 kg/m².    Limited Physical Exam due to Telehealth Visit     Physical Exam  Constitutional:       Appearance: Normal appearance.   Eyes:      Extraocular Movements: Extraocular movements intact.      Pupils: Pupils are equal, round, and reactive to light.   Pulmonary:      Effort: Pulmonary effort is normal.   Neurological:      Mental Status: She is alert and oriented to person, place, and time.   Psychiatric:         Mood and Affect: Mood normal.       Labs/Tests 12/22/22:    Sodium 136 - 147 mmol/L 135 Low     Potassium 3.5 - 5.1 mmol/L 3.7    Chloride 98 - 107 mmol/L 102    CO2 20 - 31 mmol/L 24    Glucose 70 - 99 mg/dL 83    BUN 9 - 23 mg/dL 53 High     Creatinine 0.55 - 1.02 mg/dL 4.79 High     Calcium 8.3 - 10.6 mg/dL 9.4    Phosphorus Level 2.4 - 5.1 mg/dL 3.8    Albumin Level 3.2 - 4.8 g/dL 4.3    Anion Gap 5.0 - 15.0 mmol/L 8.8    eGFR CKD-EPI >90 ml/min/1.73m2 9 Low       PTH, Intact 15 - 65 pg/mL 154 High       Component Ref Range & Units 1 d ago   (12/22/22) 11 d ago   (12/12/22) 2 wk ago   (12/8/22) 3 wk ago   (12/1/22) 3 wk ago   (11/29/22) 1 mo ago   (11/7/22) 2 mo ago   (10/24/22)   Hemoglobin 11.3 - 15.4 g/dL 8.2 Low   8.1 Low   8.2 Low   9.1 Low   8.3 Low   8.8 Low   9.3 Low       Component Ref Range & Units 1 d ago    Vitamin D 25-Hydroxy, Blood 30.0 - 100.0 ng/mL 55.5        Lab Results   Component Value Date    UPROTCREA 3.4 (H) 12/22/2022    UPROTCREA 3.1 (H) 12/01/2022    UPROTCREA 2.3 (H) 10/19/2022    EXTIRONSATUR 37 (H) 10/19/2022    EXTIRONSATUR 37 (H) 09/02/2022    EXTIRONSATUR 47 (H) 02/23/2022           Diagnosis:  Plan and Assessment:  1. Type 2 diabetes mellitus with stage 5 chronic kidney disease not on chronic dialysis, without long-term current use of insulin  Assessment & Plan:  Control status unknown - Continue " Glucotrol 2.5 mg po daily     Orders:  -     Renal Function Panel; Future; Expected date: 01/06/2023  -     Protein/Creatinine Ratio, Urine; Future; Expected date: 01/06/2023    2. Hypertension with impaired renal function  Assessment & Plan:  Not At goal, Monitor BP, Monitor BP, 2 Gram NA diet, Continue Amlodipine 10 mg po daily, Hydralazine 100 mg po TID, Metoprolol 50 mg po daily, Clonidine 0.1 mg po prn SBP > 180;  Lasix 40 mg to take prn - based on 3-5 lb weight gain and/or elevated BP.   She reports she has not taken the Clonidine but she has taken the Lasix this past week.     Orders:  -     Renal Function Panel; Future; Expected date: 01/06/2023    3. Chronic kidney disease (CKD), active medical management without dialysis, stage 5  Overview:  Left upper arm cephalic vein transposition AV fistula on 9/23/22 - by Dr. Cortes     Assessment & Plan:  Serum Creatinine 4.79 / eGFR 9; Avoid NSAIDS, Assure 64 oz of water daily, Meds per med list above      She has attended a CKD Smart Class; She has a Left Upper arm AV Fistula - placed 9/23/22; She was seen by Dr. Cortes on 10/31 and an AV US was ordered / scheduled for 11/8.  Per Dr. Cortes - he will check the results of the US and if no specific problems with US we will plan to proceed with dialysis utilizing the AV fistula if dialysis is needed.     At last OV on 12/9/22:  Have discussed with the patient and her family that she is at the point of needing to start dialysis.  The family is reluctant to start.  I have asked if they would like a referral to an outside nephrologist for a second opinion and also offered an appointment with one of our local Nephrologist.   They prefer an outside nephrologist.   The family has requested a referral to Dr. Robe Talamantes in [place].       Update 12/23/22:  They have not heard anything from the referral that was placed.  I made them aware that I spoke with Dr. Talamantes and reviewed her labs with him and he is in agreement  that she needs to start hemodialysis.   The pt. And family expressed a desire to wait at this time due to her serum creatinine decreasing from 5.46 to 4.79 from last visit to this visit.  She is basically asymptomatic and I will support their decision.          Orders:  -     Hemoglobin; Future; Expected date: 01/06/2023  -     Renal Function Panel; Future; Expected date: 01/06/2023  -     Protein/Creatinine Ratio, Urine; Future; Expected date: 01/06/2023    4. Arteriovenous fistula for hemodialysis in place, primary  Overview:  Left upper arm cephalic vein transposition AV fistula on 9/23/22 - by Dr. Cortes     Assessment & Plan:  Left AV Fistula is patent with good bruit and thrill       5. Nephrotic range proteinuria  Assessment & Plan:  3400 mg - up from 3100 mg -     Orders:  -     Protein/Creatinine Ratio, Urine; Future; Expected date: 01/06/2023    6. Metabolic acidosis  Assessment & Plan:  C02 24 - Continue NA HC03 650 mg po BID -      7. Secondary hyperparathyroidism of renal origin  Assessment & Plan:    - Decrease Rocaltrol 25 mcg po to M-W-F       8. Anemia in stage 5 chronic kidney disease, not on chronic dialysis  Assessment & Plan:  Hgb 8.2 - Continue AFIA therapy per procotol     Check Iron studies also     Orders:  -     Hemoglobin; Future; Expected date: 01/06/2023    9. Allergy to ACE inhibitors  Assessment & Plan:  Unable to use ACE due to allergy       10. Allergy to angiotensin receptor blockers (ARB)  Assessment & Plan:  Unable to use ARB due to allergy       11. Hyperlipidemia, mixed  Assessment & Plan:  Continue Lipitor 20 mg po daily       Other orders  -     epoetin elis-epbx injection 6,000 Units           My reconciliation of medication should not condone or support use of medications that have been previously prescribed for patients by other providers.  I am only documenting the current medications patients is taking and may change doses, add or discontinue medications based on  information provided by the patient.     The patient has been provided with their current level of kidney function including eGFR and creatinine.    We discussed the potential for common complications of CKD including anemia, electrolyte abnormalities, abnormal fluid balance, mineral bone disease and malnutrition.    We discussed strategies to slow the progression of their kidney disease including:  Avoid nephrotoxic agents. Avoid over-the-counter and prescription NSAIDs (Ibuprofren, Motrin, Naproxyn, Aleve, Mobic, Celebrex, Toradol, Advil). All of these can worsen kidney function, elevate BP, cause fluid retention/swelling and elevate potassium. Avoid iodine contrast agents and gadolinium, which can worsen kidney function and/or cause kidney failure. Avoid phosphosoda bowel preps which can worsen kidney function.  Work to improve modifiable risk factors. Aim for good control of blood glucose without episodes of hypoglycemia. Notify the provider managing your diabetes if your blood glucose < 60. Aim for good blood pressure control without episodes of hypotension. Call the office if your systolic blood pressure is consistently < 110. Aim for good control of your cholesterol.  AIC goal <7.0  BP goal <130/80        Keeping these in goal range will help prevent progression of cardiovascular disease            and chronic kidney disease.    We discussed dietary modifications:  Low sodium diet: 2 gm/d or less  Limit/avoid high potassium foods  Avoid potassium containing salt substitutes  Limit/avoid high phosphorus foods  Limit daily protein intake to 0.8-1 gm/kg of your ideal body weight.    We discussed lifestyle modifications:  Make sure you are drinking plenty of fluids--64 ounces (1/2 gallon) daily  Exercise at least 30 minutes 5 x per week (total 150 minutes per week), example brisk walking  Achieve and maintain a healthy weight (BMI 20-25)  Limit alcohol consumption to <2 drinks per day  Stop smoking  Make sure you  stay current on important vaccines-- pneumonia vaccines (Pneumovax and Prevnar), flu vaccine, Hepatitis B (especially patients nearing renal replacement therapy and planning hemodialysis) and Covid-19 vaccine.     Recommendations:  Monitor your BP at home daily and record.  Bring readings to your next appt.  Call the office if your BP is persistently >130/80.  Seek urgent medical attention with signs and symptoms of uremia - extreme weakness, fatigue, confusion, anorexia, metallic taste in mouth, hiccoughs, cramping, itching, chest pain, swelling, or trouble sleeping.    Follow Up:   Follow up in about 2 weeks (around 1/6/2023).

## 2022-12-23 NOTE — ASSESSMENT & PLAN NOTE
Serum Creatinine 4.79 / eGFR 9; Avoid NSAIDS, Assure 64 oz of water daily, Meds per med list above      She has attended a CKD Smart Class; She has a Left Upper arm AV Fistula - placed 9/23/22; She was seen by Dr. Cortes on 10/31 and an AV US was ordered / scheduled for 11/8.  Per Dr. Cortes - he will check the results of the US and if no specific problems with US we will plan to proceed with dialysis utilizing the AV fistula if dialysis is needed.     At last OV on 12/9/22:  Have discussed with the patient and her family that she is at the point of needing to start dialysis.  The family is reluctant to start.  I have asked if they would like a referral to an outside nephrologist for a second opinion and also offered an appointment with one of our local Nephrologist.   They prefer an outside nephrologist.   The family has requested a referral to Dr. Robe Talamantes in [place].       Update 12/23/22:  They have not heard anything from the referral that was placed.  I made them aware that I spoke with Dr. Talamantes and reviewed her labs with him and he is in agreement that she needs to start hemodialysis.   The pt. And family expressed a desire to wait at this time due to her serum creatinine decreasing from 5.46 to 4.79 from last visit to this visit.  She is basically asymptomatic and I will support their decision.

## 2023-01-01 ENCOUNTER — OUTSIDE PLACE OF SERVICE (OUTPATIENT)
Dept: NEPHROLOGY | Facility: CLINIC | Age: 73
End: 2023-01-01
Payer: MEDICARE

## 2023-01-01 PROCEDURE — 90961 PR ESRD SERVICES, PER MONTH, 20+ YR OLD, 2-3 VISITS: ICD-10-PCS | Mod: ,,, | Performed by: INTERNAL MEDICINE

## 2023-01-01 PROCEDURE — 90961 ESRD SRV 2-3 VSTS P MO 20+: CPT | Mod: ,,, | Performed by: INTERNAL MEDICINE

## 2023-01-06 ENCOUNTER — OFFICE VISIT (OUTPATIENT)
Dept: NEPHROLOGY | Facility: CLINIC | Age: 73
End: 2023-01-06
Payer: MEDICARE

## 2023-01-06 VITALS
DIASTOLIC BLOOD PRESSURE: 83 MMHG | OXYGEN SATURATION: 99 % | SYSTOLIC BLOOD PRESSURE: 203 MMHG | HEART RATE: 76 BPM | HEIGHT: 66 IN | WEIGHT: 190 LBS | BODY MASS INDEX: 30.53 KG/M2

## 2023-01-06 DIAGNOSIS — I12.9 HYPERTENSION WITH IMPAIRED RENAL FUNCTION: ICD-10-CM

## 2023-01-06 DIAGNOSIS — Z88.8 ALLERGY TO ANGIOTENSIN RECEPTOR BLOCKERS (ARB): ICD-10-CM

## 2023-01-06 DIAGNOSIS — E78.2 HYPERLIPIDEMIA, MIXED: ICD-10-CM

## 2023-01-06 DIAGNOSIS — R80.9 NEPHROTIC RANGE PROTEINURIA: ICD-10-CM

## 2023-01-06 DIAGNOSIS — N18.5 TYPE 2 DIABETES MELLITUS WITH STAGE 5 CHRONIC KIDNEY DISEASE NOT ON CHRONIC DIALYSIS, WITHOUT LONG-TERM CURRENT USE OF INSULIN: ICD-10-CM

## 2023-01-06 DIAGNOSIS — E11.22 TYPE 2 DIABETES MELLITUS WITH STAGE 5 CHRONIC KIDNEY DISEASE NOT ON CHRONIC DIALYSIS, WITHOUT LONG-TERM CURRENT USE OF INSULIN: ICD-10-CM

## 2023-01-06 DIAGNOSIS — D63.1 ANEMIA IN STAGE 5 CHRONIC KIDNEY DISEASE, NOT ON CHRONIC DIALYSIS: ICD-10-CM

## 2023-01-06 DIAGNOSIS — Z99.2 ARTERIOVENOUS FISTULA FOR HEMODIALYSIS IN PLACE, PRIMARY: ICD-10-CM

## 2023-01-06 DIAGNOSIS — E87.20 METABOLIC ACIDOSIS: ICD-10-CM

## 2023-01-06 DIAGNOSIS — N18.5 CHRONIC KIDNEY DISEASE (CKD), ACTIVE MEDICAL MANAGEMENT WITHOUT DIALYSIS, STAGE 5: ICD-10-CM

## 2023-01-06 DIAGNOSIS — N18.5 ANEMIA IN STAGE 5 CHRONIC KIDNEY DISEASE, NOT ON CHRONIC DIALYSIS: ICD-10-CM

## 2023-01-06 DIAGNOSIS — N25.81 SECONDARY HYPERPARATHYROIDISM OF RENAL ORIGIN: ICD-10-CM

## 2023-01-06 DIAGNOSIS — Z88.8 ALLERGY TO ACE INHIBITORS: ICD-10-CM

## 2023-01-06 PROCEDURE — 1101F PT FALLS ASSESS-DOCD LE1/YR: CPT | Mod: CPTII,,, | Performed by: NURSE PRACTITIONER

## 2023-01-06 PROCEDURE — 3066F PR DOCUMENTATION OF TREATMENT FOR NEPHROPATHY: ICD-10-PCS | Mod: CPTII,,, | Performed by: NURSE PRACTITIONER

## 2023-01-06 PROCEDURE — 3288F FALL RISK ASSESSMENT DOCD: CPT | Mod: CPTII,,, | Performed by: NURSE PRACTITIONER

## 2023-01-06 PROCEDURE — 3077F PR MOST RECENT SYSTOLIC BLOOD PRESSURE >= 140 MM HG: ICD-10-PCS | Mod: CPTII,,, | Performed by: NURSE PRACTITIONER

## 2023-01-06 PROCEDURE — 3077F SYST BP >= 140 MM HG: CPT | Mod: CPTII,,, | Performed by: NURSE PRACTITIONER

## 2023-01-06 PROCEDURE — 3288F PR FALLS RISK ASSESSMENT DOCUMENTED: ICD-10-PCS | Mod: CPTII,,, | Performed by: NURSE PRACTITIONER

## 2023-01-06 PROCEDURE — 1101F PR PT FALLS ASSESS DOC 0-1 FALLS W/OUT INJ PAST YR: ICD-10-PCS | Mod: CPTII,,, | Performed by: NURSE PRACTITIONER

## 2023-01-06 PROCEDURE — 1159F PR MEDICATION LIST DOCUMENTED IN MEDICAL RECORD: ICD-10-PCS | Mod: CPTII,,, | Performed by: NURSE PRACTITIONER

## 2023-01-06 PROCEDURE — 3079F DIAST BP 80-89 MM HG: CPT | Mod: CPTII,,, | Performed by: NURSE PRACTITIONER

## 2023-01-06 PROCEDURE — 3066F NEPHROPATHY DOC TX: CPT | Mod: CPTII,,, | Performed by: NURSE PRACTITIONER

## 2023-01-06 PROCEDURE — 1159F MED LIST DOCD IN RCRD: CPT | Mod: CPTII,,, | Performed by: NURSE PRACTITIONER

## 2023-01-06 PROCEDURE — 3079F PR MOST RECENT DIASTOLIC BLOOD PRESSURE 80-89 MM HG: ICD-10-PCS | Mod: CPTII,,, | Performed by: NURSE PRACTITIONER

## 2023-01-06 PROCEDURE — 1160F RVW MEDS BY RX/DR IN RCRD: CPT | Mod: CPTII,,, | Performed by: NURSE PRACTITIONER

## 2023-01-06 PROCEDURE — 1160F PR REVIEW ALL MEDS BY PRESCRIBER/CLIN PHARMACIST DOCUMENTED: ICD-10-PCS | Mod: CPTII,,, | Performed by: NURSE PRACTITIONER

## 2023-01-06 PROCEDURE — 99214 PR OFFICE/OUTPT VISIT, EST, LEVL IV, 30-39 MIN: ICD-10-PCS | Mod: ,,, | Performed by: NURSE PRACTITIONER

## 2023-01-06 PROCEDURE — 3008F BODY MASS INDEX DOCD: CPT | Mod: CPTII,,, | Performed by: NURSE PRACTITIONER

## 2023-01-06 PROCEDURE — 3008F PR BODY MASS INDEX (BMI) DOCUMENTED: ICD-10-PCS | Mod: CPTII,,, | Performed by: NURSE PRACTITIONER

## 2023-01-06 PROCEDURE — 99214 OFFICE O/P EST MOD 30 MIN: CPT | Mod: ,,, | Performed by: NURSE PRACTITIONER

## 2023-01-06 RX ORDER — FUROSEMIDE 40 MG/1
40 TABLET ORAL
Qty: 90 TABLET | Refills: 3 | Status: SHIPPED | OUTPATIENT
Start: 2023-01-06 | End: 2024-01-06

## 2023-01-06 RX ORDER — AMLODIPINE BESYLATE 5 MG/1
10 TABLET ORAL 2 TIMES DAILY
Qty: 360 TABLET | Refills: 3 | Status: SHIPPED | OUTPATIENT
Start: 2023-01-06

## 2023-01-06 NOTE — ASSESSMENT & PLAN NOTE
Serum Creatinine 4.90 / eGFR 9 (4.90/9 12/22/22) ; Avoid NSAIDS, Assure 64 oz of water daily, Meds per med list above      She has attended a CKD Smart Class; She has a Left Upper arm AV Fistula - placed 9/23/22; She was seen by Dr. Cortes on 10/31 and an AV US was ordered / scheduled for 11/8.  Per Dr. Cortes - he will check the results of the US and if no specific problems with US we will plan to proceed with dialysis utilizing the AV fistula if dialysis is needed.     At last OV on 12/9/22:  Have discussed with the patient and her family that she is at the point of needing to start dialysis.  The family is reluctant to start.  I have asked if they would like a referral to an outside nephrologist for a second opinion and also offered an appointment with one of our local Nephrologist.   They prefer an outside nephrologist.   The family has requested a referral to Dr. Robe Talamantes in [place].       Update 12/23/22:  They have not heard anything from the referral that was placed.  I made them aware that I spoke with Dr. Talamantes and reviewed her labs with him and he is in agreement that she needs to start hemodialysis.      Update 1/6/23:  The patient remains asymptomatic and desires to wait to start hemodialysis.  I will see her back in 2 weeks.  She is aware should she develop symptoms to report to the [facility] ER.

## 2023-01-06 NOTE — PROGRESS NOTES
Pt Name:  Mekhi Tinsley  Pt :  1950  Pt MRN:  [id number]    Date: 2023    Reason for visit:   Mekhi Tinsley is a 72 y.o. 71 y/o  presenting for CKD follow up with serum creatinine 4.90, eGFR 9 and Chronic Kidney Disease Stage V.     Chief Complaint:   The patient denies any complaints today.    HPI:  The patient is being seen today for follow up CKD and the status of her kidney function. Since the last visit, patient reports no changes in health.  The patient denies fatigue, weakness, poor appetite, metallic taste, nausea, cramping, chest pain, palpitations, SOB, orthopnea, PND, edema, trouble concentrating, decreased urination.      Home BPs when checked are <160/80, occasionally 170 systolic per patient. The patient is following a low sodium diet. The patient is avoiding NSAIDs. The patient is drinking 60 oz of water daily.     Since last visit on 22 patient reports no changes in medical history.      History:   Past Medical History:   Diagnosis Date    Diabetes mellitus type II, non insulin dependent 2022    Disorder of kidney and ureter     Diverticulosis     DM type 2 (diabetes mellitus, type 2)     DVT (deep vein thrombosis) in pregnancy     Essential hypertension 2022    HTN (hypertension)     Mild intermittent asthma, uncomplicated     PAD (peripheral artery disease)     S/P IVC filter      Past Surgical History:   Procedure Laterality Date     VENA CAVA FILTER PLACEMENT      COLONOSCOPY      DIALYSIS FISTULA CREATION Left 10/2022    HYSTERECTOMY       Family History   Problem Relation Age of Onset    Hypertension Mother     Hypertension Father     Hypertension Sister     Heart failure Sister     Hypertension Brother      Social History     Substance and Sexual Activity   Alcohol Use Never     Social History     Substance and Sexual Activity   Drug Use Yes    Types: Hydrocodone     Social History     Substance and Sexual Activity   Sexual Activity  Never     reports never being sexually active.  Social History     Tobacco Use   Smoking Status Never   Smokeless Tobacco Never       Allergies:  Review of patient's allergies indicates:   Allergen Reactions    Ace inhibitors Swelling    Levofloxacin Other (See Comments)     Dropped fsbs to 50      Irbesartan Nausea Only    Tramadol Nausea Only         Current Outpatient Medications:     amLODIPine (NORVASC) 5 MG tablet, Take 5 mg by mouth 2 (two) times daily., Disp: , Rfl:     atorvastatin (LIPITOR) 20 MG tablet, Take 20 mg by mouth once daily., Disp: , Rfl:     calcitRIOL (ROCALTROL) 0.25 MCG Cap, Take 0.25 mcg by mouth every Mon, Wed, Fri., Disp: , Rfl:     cholecalciferol, vitamin D3, (VITAMIN D3) 25 mcg (1,000 unit) capsule, Take 1,000 Units by mouth once daily., Disp: , Rfl:     cloNIDine (CATAPRES) 0.1 MG tablet, Take 0.1 mg by mouth 1 (one) time if needed. Only takes prn if SBP > 180, Disp: , Rfl:     cyanocobalamin (VITAMIN B-12) 100 MCG tablet, Take 100 mcg by mouth once daily., Disp: , Rfl:     fluticasone propionate (FLONASE) 50 mcg/actuation nasal spray, 2 sprays as needed., Disp: , Rfl:     furosemide (LASIX) 40 MG tablet, Take 1 tablet (40 mg total) by mouth daily as needed (3 - 5 lb weight gain). (Patient taking differently: Take 40 mg by mouth as needed.), Disp: 30 tablet, Rfl: 11    gabapentin (NEURONTIN) 100 MG capsule, Take 100 mg by mouth as needed., Disp: , Rfl:     glipiZIDE (GLUCOTROL) 2.5 MG TR24, Take 1 tablet (2.5 mg total) by mouth daily with breakfast., Disp: 30 tablet, Rfl: 0    hydrALAZINE (APRESOLINE) 100 MG tablet, Take 100 mg by mouth every 8 (eight) hours., Disp: , Rfl:     HYDROcodone-acetaminophen (NORCO) 5-325 mg per tablet, Take 1 tablet by mouth daily as needed., Disp: , Rfl:     methocarbamoL (ROBAXIN) 500 MG Tab, Take 500 mg by mouth as needed., Disp: , Rfl:     metoprolol succinate 50 mg CSpX, Take 50 mg by mouth once daily at 6am., Disp: , Rfl:      "sodium bicarbonate 325 MG tablet, Take 2 tablets (650 mg total) by mouth 2 (two) times daily. (Patient taking differently: Take 650 mg by mouth 2 (two) times daily. Take 2 tablets 2 times a day), Disp: 180 tablet, Rfl: 3    tiZANidine 4 mg Cap, Take 4 mg by mouth as needed., Disp: , Rfl:     ROS:       Physical Exam:   Vital Signs:  /83  Pulse 76  Sat 99%  Weight 86.2 (190 lbs)  Height 5' 6"  BMI 30.67       LABS 1/5/23:     Ref Range & Units 1 d ago   Sodium 136 - 147 mmol/L 139    Potassium 3.5 - 5.1 mmol/L 3.6    Chloride 98 - 107 mmol/L 104    CO2 20 - 31 mmol/L 24    Glucose 70 - 99 mg/dL 125 High     BUN 9 - 23 mg/dL 63 High     Creatinine 0.55 - 1.02 mg/dL 4.90 High     Calcium 8.3 - 10.6 mg/dL 9.1    Phosphorus Level 2.4 - 5.1 mg/dL 4.2    Albumin Level 3.2 - 4.8 g/dL 3.9    Anion Gap 5.0 - 15.0 mmol/L 10.7    eGFR CKD-EPI >90 ml/min/1.73m2 9 Low       Component Ref Range & Units 1 d ago   (1/5/23) 10 d ago   (12/27/22) 2 wk ago   (12/22/22) 3 wk ago   (12/12/22) 4 wk ago   (12/8/22) 1 mo ago   (12/1/22) 1 mo ago   (11/29/22)   Hemoglobin 11.3 - 15.4 g/dL 8.1 Low   8.0 Low   8.2 Low   8.1 Low   8.2 Low   9.1 Low        Component Ref Range & Units 1 d ago   (1/5/23) 2 wk ago   (12/22/22) 2 mo ago   (10/19/22) 4 mo ago   (9/2/22) 10 mo ago   (2/23/22) 1 yr ago   (12/15/21) 1 yr ago   (11/17/21)   Iron 50 - 175 ug/dL 55  41 Low   90  88  100  48 Low   39 Low     TIBC 250 - 425 ug/dL 227 Low   229 Low   242 Low   238 Low   213 Low   225 Low   224 Low     Iron Saturation 15 - 20 % 24 High   18  37 High   37 High   47 High   21 High        Component Ref Range & Units 1 d ago   (1/5/23) 2 wk ago   (12/22/22) 2 mo ago   (10/19/22) 4 mo ago   (9/2/22) 10 mo ago   (2/23/22) 1 yr ago   (12/15/21) 1 yr ago   (11/17/21)   Ferritin 7.3 - 270.7 ng/mL 500.6 High   564.0 High   840.3 High   1,007.6 High   896.8 High   448.7 High         Component Ref Range & Units 1 d ago 2 wk ago 1 mo ago 2 mo ago 4 mo ago 6 mo ago 9 " mo ago   Protein, Urine mg/dL 227  86  204  292  267  637  797    Comment: Reference range not established, clinically correlate with history and symptoms.   Creatinine, Urine mg/dL 88.0  25.8  66.8  124.4  76.4  79.2  59.0    Comment: Reference range not established, clinically correlate with history and symptoms.   Urine Protein/Creatinine Ratio <0.2 mg of protein/mg of creatinine 2.6 High   3.4 High   3.1 High   2.3 High   3.5 High   8.0 High          Ref Range & Units 2 wk ago   PTH, Intact 15 - 65 pg/mL 154 High           Diagnosis:  Plan and Assessment:  1. Type 2 diabetes mellitus with stage 5 chronic kidney disease not on chronic dialysis, without long-term current use of insulin  Assessment & Plan:  Control status unknown - Continue Glucotrol 2.5 mg po daily       2. Hypertension with impaired renal function  Assessment & Plan:  Not At goal, Monitor BP, Monitor BP, 2 Gram NA diet, Continue Amlodipine 10 mg po daily, Hydralazine 100 mg po TID, Metoprolol 50 mg po daily, Clonidine 0.1 mg po prn SBP > 180;  Lasix 40 mg to take prn - based on 3-5 lb weight gain and/or elevated BP.   She reports she has not taken the Clonidine but she has taken the Lasix this past week.       3. Chronic kidney disease (CKD), active medical management without dialysis, stage 5  Overview:  Left upper arm cephalic vein transposition AV fistula on 9/23/22 - by Dr. Cortes     Assessment & Plan:  Serum Creatinine 4.90 / eGFR 9 (4.90/9 12/22/22) ; Avoid NSAIDS, Assure 64 oz of water daily, Meds per med list above      She has attended a CKD Smart Class; She has a Left Upper arm AV Fistula - placed 9/23/22; She was seen by Dr. Cortes on 10/31 and an AV US was ordered / scheduled for 11/8.  Per Dr. Cortes - he will check the results of the US and if no specific problems with US we will plan to proceed with dialysis utilizing the AV fistula if dialysis is needed.     At last OV on 12/9/22:  Have discussed with the patient and her family  that she is at the point of needing to start dialysis.  The family is reluctant to start.  I have asked if they would like a referral to an outside nephrologist for a second opinion and also offered an appointment with one of our local Nephrologist.   They prefer an outside nephrologist.   The family has requested a referral to Dr. Robe Talamantes in [place].       Update 12/23/22:  They have not heard anything from the referral that was placed.  I made them aware that I spoke with Dr. Talamantes and reviewed her labs with him and he is in agreement that she needs to start hemodialysis.             4. Arteriovenous fistula for hemodialysis in place, primary  Overview:  Left upper arm cephalic vein transposition AV fistula on 9/23/22 - by Dr. Cortes     Assessment & Plan:  Left AV Fistula is patent with good bruit and thrill       5. Nephrotic range proteinuria  Assessment & Plan:  2600 mg down from 3400 mg       6. Metabolic acidosis  Assessment & Plan:  C02 24 - Continue NA HC03 650 mg po BID -      7. Secondary hyperparathyroidism of renal origin  Assessment & Plan:    - Continue Rocaltrol 25 mcg po to M-W-F       8. Anemia in stage 5 chronic kidney disease, not on chronic dialysis  Assessment & Plan:  Hgb 8.1- Continue AFIA therapy per procotol     Iron Sat 24%  TIBC 227  Orpm 41  Ferritin 500.6          9. Allergy to ACE inhibitors  Assessment & Plan:  Unable to use ACE due to allergy       10. Allergy to angiotensin receptor blockers (ARB)  Assessment & Plan:  Unable to use ARB due to allergy       11. Hyperlipidemia, mixed  Assessment & Plan:  Continue Lipitor 20 mg po daily              My reconciliation of medication should not condone or support use of medications that have been previously prescribed for patients by other providers.  I am only documenting the current medications patients is taking and may change doses, add or discontinue medications based on information provided by the patient.     The  patient has been provided with their current level of kidney function including eGFR and creatinine.    We discussed the potential for common complications of CKD including anemia, electrolyte abnormalities, abnormal fluid balance, mineral bone disease and malnutrition.    We discussed strategies to slow the progression of their kidney disease including:  Avoid nephrotoxic agents. Avoid over-the-counter and prescription NSAIDs (Ibuprofren, Motrin, Naproxyn, Aleve, Mobic, Celebrex, Toradol, Advil). All of these can worsen kidney function, elevate BP, cause fluid retention/swelling and elevate potassium. Avoid iodine contrast agents and gadolinium, which can worsen kidney function and/or cause kidney failure. Avoid phosphosoda bowel preps which can worsen kidney function.  Work to improve modifiable risk factors. Aim for good control of blood glucose without episodes of hypoglycemia. Notify the provider managing your diabetes if your blood glucose < 60. Aim for good blood pressure control without episodes of hypotension. Call the office if your systolic blood pressure is consistently < 110. Aim for good control of your cholesterol.  AIC goal <7.0  BP goal <130/80        Keeping these in goal range will help prevent progression of cardiovascular disease            and chronic kidney disease.    We discussed dietary modifications:  Low sodium diet: 2 gm/d or less  Limit/avoid high potassium foods  Avoid potassium containing salt substitutes  Limit/avoid high phosphorus foods  Limit daily protein intake to 0.8-1 gm/kg of your ideal body weight.    We discussed lifestyle modifications:  Make sure you are drinking plenty of fluids--64 ounces (1/2 gallon) daily  Exercise at least 30 minutes 5 x per week (total 150 minutes per week), example brisk walking  Achieve and maintain a healthy weight (BMI 20-25)  Limit alcohol consumption to <2 drinks per day  Stop smoking  Make sure you stay current on important vaccines--  pneumonia vaccines (Pneumovax and Prevnar), flu vaccine, Hepatitis B (especially patients nearing renal replacement therapy and planning hemodialysis) and Covid-19 vaccine.     Recommendations:  Monitor your BP at home daily and record.  Bring readings to your next appt.  Call the office if your BP is persistently >130/80.  Seek urgent medical attention with signs and symptoms of uremia - extreme weakness, fatigue, confusion, anorexia, metallic taste in mouth, hiccoughs, cramping, itching, chest pain, swelling, or trouble sleeping.    Follow Up:   No follow-ups on file.

## 2023-01-06 NOTE — PROGRESS NOTES
Pt Name:  Mekhi Tinsley  Pt :  1950  Pt MRN:  [id number]    Date: 2023    Reason for visit:   Mekhi Tinsley is a 72 y.o. aa presenting for CKD follow up with serum creatinine 4.90, eGFR 9 and Chronic Kidney Disease Stage V.     Chief Complaint:   The patient denies any complaints today.    HPI:  The patient is being seen today for follow up CKD and the status of her kidney function. Since the last visit, patient reports no health changes.  The patient denies fatigue, weakness, poor appetite, metallic taste, nausea, cramping, chest pain, palpitations, SOB, orthopnea, PND, edema, trouble concentrating, decreased urination.      Home BPs when checked are <160/80, and occasionally 170 system per patient. The patient is following a low sodium diet. The patient is avoiding NSAIDs. The patient is drinking 64 oz of water daily.     Since last visit on 22 via telehealth patient reports no changes in medical history.      History:   Past Medical History:   Diagnosis Date    Diabetes mellitus type II, non insulin dependent 2022    Disorder of kidney and ureter     Diverticulosis     DM type 2 (diabetes mellitus, type 2)     DVT (deep vein thrombosis) in pregnancy     Essential hypertension 2022    HTN (hypertension)     Mild intermittent asthma, uncomplicated     PAD (peripheral artery disease)     S/P IVC filter      Past Surgical History:   Procedure Laterality Date     VENA CAVA FILTER PLACEMENT      COLONOSCOPY      DIALYSIS FISTULA CREATION Left 10/2022    HYSTERECTOMY       Family History   Problem Relation Age of Onset    Hypertension Mother     Hypertension Father     Hypertension Sister     Heart failure Sister     Hypertension Brother      Social History     Substance and Sexual Activity   Alcohol Use Never     Social History     Substance and Sexual Activity   Drug Use Yes    Types: Hydrocodone     Social History     Substance and Sexual Activity   Sexual Activity Never      reports never being sexually active.  Social History     Tobacco Use   Smoking Status Never   Smokeless Tobacco Never       Allergies:  Review of patient's allergies indicates:   Allergen Reactions    Ace inhibitors Swelling    Levofloxacin Other (See Comments)     Dropped fsbs to 50      Irbesartan Nausea Only    Tramadol Nausea Only         Current Outpatient Medications:     atorvastatin (LIPITOR) 20 MG tablet, Take 20 mg by mouth once daily., Disp: , Rfl:     calcitRIOL (ROCALTROL) 0.25 MCG Cap, Take 0.25 mcg by mouth every Mon, Wed, Fri., Disp: , Rfl:     cholecalciferol, vitamin D3, (VITAMIN D3) 25 mcg (1,000 unit) capsule, Take 1,000 Units by mouth once daily., Disp: , Rfl:     cloNIDine (CATAPRES) 0.1 MG tablet, Take 0.1 mg by mouth 1 (one) time if needed. Only takes prn if SBP > 180, Disp: , Rfl:     cyanocobalamin (VITAMIN B-12) 100 MCG tablet, Take 100 mcg by mouth once daily., Disp: , Rfl:     fluticasone propionate (FLONASE) 50 mcg/actuation nasal spray, 2 sprays as needed., Disp: , Rfl:     gabapentin (NEURONTIN) 100 MG capsule, Take 100 mg by mouth as needed., Disp: , Rfl:     glipiZIDE (GLUCOTROL) 2.5 MG TR24, Take 1 tablet (2.5 mg total) by mouth daily with breakfast., Disp: 30 tablet, Rfl: 0    hydrALAZINE (APRESOLINE) 100 MG tablet, Take 100 mg by mouth every 8 (eight) hours., Disp: , Rfl:     HYDROcodone-acetaminophen (NORCO) 5-325 mg per tablet, Take 1 tablet by mouth daily as needed., Disp: , Rfl:     methocarbamoL (ROBAXIN) 500 MG Tab, Take 500 mg by mouth as needed., Disp: , Rfl:     metoprolol succinate 50 mg CSpX, Take 50 mg by mouth once daily at 6am., Disp: , Rfl:     sodium bicarbonate 325 MG tablet, Take 2 tablets (650 mg total) by mouth 2 (two) times daily. (Patient taking differently: Take 650 mg by mouth 2 (two) times daily. Take 2 tablets 2 times a day), Disp: 180 tablet, Rfl: 3    tiZANidine 4 mg Cap, Take 4 mg by mouth as needed., Disp: , Rfl:     amLODIPine (NORVASC) 5 MG tablet,  "Take 2 tablets (10 mg total) by mouth 2 (two) times daily., Disp: 360 tablet, Rfl: 3    furosemide (LASIX) 40 MG tablet, Take 1 tablet (40 mg total) by mouth every Mon, Wed, Fri., Disp: 90 tablet, Rfl: 3    ROS:  Review of Systems   Constitutional:  Negative for activity change and appetite change.   HENT:  Negative for hearing loss.    Eyes:  Negative for visual disturbance.   Respiratory:  Negative for shortness of breath and wheezing.    Cardiovascular:  Negative for chest pain.   Gastrointestinal:  Negative for abdominal pain, diarrhea, nausea and vomiting.   Genitourinary:  Negative for decreased urine volume, dysuria, flank pain, frequency and urgency.   Neurological:  Negative for dizziness, weakness and headaches.     Physical Exam:   Vitals:   Vitals:    01/06/23 0908   BP: (!) 203/83   Pulse: 76   SpO2: 99%   Weight: 86.2 kg (190 lb)   Height: 5' 6" (1.676 m)     Body mass index is 30.67 kg/m².    Physical Exam  Vitals reviewed. Exam conducted with a chaperone present (daughter present in exam room, and other daughters on speaker phone).   Constitutional:       General: She is not in acute distress.     Appearance: Normal appearance.   HENT:      Head: Normocephalic and atraumatic.      Mouth/Throat:      Mouth: Mucous membranes are moist.   Eyes:      Extraocular Movements: Extraocular movements intact.      Pupils: Pupils are equal, round, and reactive to light.   Cardiovascular:      Rate and Rhythm: Normal rate and regular rhythm.      Heart sounds: Murmur heard.   Systolic murmur is present with a grade of 2/6.      Arteriovenous access: Left arteriovenous access is present.     Comments: Left forearm AV fistula is patent with good bruit and thrill   Pulmonary:      Effort: Pulmonary effort is normal.      Breath sounds: No wheezing, rhonchi or rales.   Musculoskeletal:         General: No swelling.      Right lower leg: Edema present.      Left lower leg: Edema present.   Skin:     General: Skin is " warm and dry.   Neurological:      Mental Status: She is alert and oriented to person, place, and time.   Psychiatric:         Mood and Affect: Mood normal.         Behavior: Behavior normal.       Labs/Tests:       Ref Range & Units 1 d ago   Sodium 136 - 147 mmol/L 139    Potassium 3.5 - 5.1 mmol/L 3.6    Chloride 98 - 107 mmol/L 104    CO2 20 - 31 mmol/L 24    Glucose 70 - 99 mg/dL 125 High     BUN 9 - 23 mg/dL 63 High     Creatinine 0.55 - 1.02 mg/dL 4.90 High     Calcium 8.3 - 10.6 mg/dL 9.1    Phosphorus Level 2.4 - 5.1 mg/dL 4.2    Albumin Level 3.2 - 4.8 g/dL 3.9    Anion Gap 5.0 - 15.0 mmol/L 10.7    eGFR CKD-EPI >90 ml/min/1.73m2 9 Low       Hemoglobin 11.3 - 15.4 g/dL 8.1 Low   8.0 Low   8.2 Low   8.1 Low   8.2 Low   9.1 Low        Component Ref Range & Units 1 d ago   (1/5/23) 2 wk ago   (12/22/22) 2 mo ago   (10/19/22) 4 mo ago   (9/2/22) 10 mo ago   (2/23/22) 1 yr ago   (12/15/21) 1 yr ago   (11/17/21)   Iron 50 - 175 ug/dL 55  41 Low   90  88  100  48 Low   39 Low     TIBC 250 - 425 ug/dL 227 Low   229 Low   242 Low   238 Low   213 Low   225 Low   224 Low     Iron Saturation 15 - 20 % 24 High   18  37 High   37 High   47 High   21 High        Component Ref Range & Units 1 d ago   (1/5/23) 2 wk ago   (12/22/22) 2 mo ago   (10/19/22) 4 mo ago   (9/2/22) 10 mo ago   (2/23/22) 1 yr ago   (12/15/21) 1 yr ago   (11/17/21)   Ferritin 7.3 - 270.7 ng/mL 500.6 High   564.0 High   840.3 High   1,007.6 High   896.8 High   448.7 High        Ref Range & Units 2 wk ago   PTH, Intact 15 - 65 pg/mL 154 High       Lab Results   Component Value Date    UPROTCREA 2.6 (H) 01/05/2023    UPROTCREA 3.4 (H) 12/22/2022    UPROTCREA 3.1 (H) 12/01/2022    EXTIRONSATUR 24 (H) 01/05/2023    EXTIRONSATUR 18 12/22/2022    EXTIRONSATUR 37 (H) 10/19/2022         Diagnosis:  Plan and Assessment:  1. Type 2 diabetes mellitus with stage 5 chronic kidney disease not on chronic dialysis, without long-term current use of insulin  Assessment  & Plan:  Control status unknown - Continue Glucotrol 2.5 mg po daily     Orders:  -     Renal Function Panel; Future; Expected date: 01/20/2023  -     Protein/Creatinine Ratio, Urine; Future; Expected date: 01/20/2023    2. Hypertension with impaired renal function  Assessment & Plan:  Not At goal, Monitor BP, 2 Gram NA diet, Increase Amlodipine 10 mg po BID, Hydralazine 100 mg po TID, Metoprolol 50 mg po daily, Change Clonidine 0.1 mg po prn SBP > 160 rather than > 180;  Increase Lasix 40 mg to M-W-F rather than prn only     Orders:  -     Renal Function Panel; Future; Expected date: 01/20/2023    3. Chronic kidney disease (CKD), active medical management without dialysis, stage 5  Overview:  Left upper arm cephalic vein transposition AV fistula on 9/23/22 - by Dr. Cortes     Assessment & Plan:  Serum Creatinine 4.90 / eGFR 9 (4.90/9 12/22/22) ; Avoid NSAIDS, Assure 64 oz of water daily, Meds per med list above      She has attended a CKD Smart Class; She has a Left Upper arm AV Fistula - placed 9/23/22; She was seen by Dr. Cortes on 10/31 and an AV US was ordered / scheduled for 11/8.  Per Dr. Cortes - he will check the results of the US and if no specific problems with US we will plan to proceed with dialysis utilizing the AV fistula if dialysis is needed.     At last OV on 12/9/22:  Have discussed with the patient and her family that she is at the point of needing to start dialysis.  The family is reluctant to start.  I have asked if they would like a referral to an outside nephrologist for a second opinion and also offered an appointment with one of our local Nephrologist.   They prefer an outside nephrologist.   The family has requested a referral to Dr. Robe Talamantes in [place].       Update 12/23/22:  They have not heard anything from the referral that was placed.  I made them aware that I spoke with Dr. Talamantes and reviewed her labs with him and he is in agreement that she needs to start hemodialysis.       Update 1/6/23:  The patient remains asymptomatic and desires to wait to start hemodialysis.  I will see her back in 2 weeks.  She is aware should she develop symptoms to [facility].          Orders:  -     Vitamin D; Future; Expected date: 01/20/2023  -     Renal Function Panel; Future; Expected date: 01/20/2023  -     PTH, Intact; Future; Expected date: 01/20/2023  -     Protein/Creatinine Ratio, Urine; Future; Expected date: 01/20/2023    4. Arteriovenous fistula for hemodialysis in place, primary  Overview:  Left upper arm cephalic vein transposition AV fistula on 9/23/22 - by Dr. Cortes     Assessment & Plan:  Left AV Fistula is patent with good bruit and thrill       5. Nephrotic range proteinuria  Assessment & Plan:  2600 mg down from 3400 mg     Orders:  -     Protein/Creatinine Ratio, Urine; Future; Expected date: 01/20/2023    6. Metabolic acidosis  Assessment & Plan:  C02 24 - Continue NA HC03 650 mg po BID -      7. Secondary hyperparathyroidism of renal origin  Assessment & Plan:    - Continue Rocaltrol 25 mcg po to M-W-F     Orders:  -     Vitamin D; Future; Expected date: 01/20/2023  -     PTH, Intact; Future; Expected date: 01/20/2023    8. Anemia in stage 5 chronic kidney disease, not on chronic dialysis  Assessment & Plan:  Hgb 8.1- Continue AFIA therapy per procotol     Iron Sat 24%  TIBC 227  Orpm 41  Ferritin 500.6        Orders:  -     Hemoglobin; Future; Expected date: 01/20/2023    9. Allergy to ACE inhibitors  Assessment & Plan:  Unable to use ACE due to allergy       10. Allergy to angiotensin receptor blockers (ARB)  Assessment & Plan:  Unable to use ARB due to allergy       11. Hyperlipidemia, mixed  Assessment & Plan:  Continue Lipitor 20 mg po daily       Other orders  -     amLODIPine (NORVASC) 5 MG tablet; Take 2 tablets (10 mg total) by mouth 2 (two) times daily.  Dispense: 360 tablet; Refill: 3  -     furosemide (LASIX) 40 MG tablet; Take 1 tablet (40 mg total)  by mouth every Mon, Wed, Fri.  Dispense: 90 tablet; Refill: 3           My reconciliation of medication should not condone or support use of medications that have been previously prescribed for patients by other providers.  I am only documenting the current medications patients is taking and may change doses, add or discontinue medications based on information provided by the patient.     The patient has been provided with their current level of kidney function including eGFR and creatinine.    We discussed the potential for common complications of CKD including anemia, electrolyte abnormalities, abnormal fluid balance, mineral bone disease and malnutrition.    We discussed strategies to slow the progression of their kidney disease including:  Avoid nephrotoxic agents. Avoid over-the-counter and prescription NSAIDs (Ibuprofren, Motrin, Naproxyn, Aleve, Mobic, Celebrex, Toradol, Advil). All of these can worsen kidney function, elevate BP, cause fluid retention/swelling and elevate potassium. Avoid iodine contrast agents and gadolinium, which can worsen kidney function and/or cause kidney failure. Avoid phosphosoda bowel preps which can worsen kidney function.  Work to improve modifiable risk factors. Aim for good control of blood glucose without episodes of hypoglycemia. Notify the provider managing your diabetes if your blood glucose < 60. Aim for good blood pressure control without episodes of hypotension. Call the office if your systolic blood pressure is consistently < 110. Aim for good control of your cholesterol.  AIC goal <7.0  BP goal <130/80        Keeping these in goal range will help prevent progression of cardiovascular disease            and chronic kidney disease.    We discussed dietary modifications:  Low sodium diet: 2 gm/d or less  Limit/avoid high potassium foods  Avoid potassium containing salt substitutes  Limit/avoid high phosphorus foods  Limit daily protein intake to 0.8-1 gm/kg of your ideal  body weight.    We discussed lifestyle modifications:  Make sure you are drinking plenty of fluids--64 ounces (1/2 gallon) daily  Exercise at least 30 minutes 5 x per week (total 150 minutes per week), example brisk walking  Achieve and maintain a healthy weight (BMI 20-25)  Limit alcohol consumption to <2 drinks per day  Stop smoking  Make sure you stay current on important vaccines-- pneumonia vaccines (Pneumovax and Prevnar), flu vaccine, Hepatitis B (especially patients nearing renal replacement therapy and planning hemodialysis) and Covid-19 vaccine.     Recommendations:  Monitor your BP at home daily and record.  Bring readings to your next appt.  Call the office if your BP is persistently >130/80.  Seek urgent medical attention with signs and symptoms of uremia - extreme weakness, fatigue, confusion, anorexia, metallic taste in mouth, hiccoughs, cramping, itching, chest pain, swelling, or trouble sleeping.    Follow Up:   Follow up in about 2 weeks (around 1/20/2023).

## 2023-01-06 NOTE — ASSESSMENT & PLAN NOTE
Not At goal, Monitor BP, 2 Gram NA diet, Increase Amlodipine 10 mg po BID, Hydralazine 100 mg po TID, Metoprolol 50 mg po daily, Change Clonidine 0.1 mg po prn SBP > 160 rather than > 180;  Increase Lasix 40 mg to M-W-F rather than prn only

## 2023-01-06 NOTE — ASSESSMENT & PLAN NOTE
Hgb 8.1- Continue AFIA therapy per procotol     Iron Sat 24%  TIBC 227  Orpm 41  Ferritin 500.6

## 2023-01-18 PROBLEM — E83.39 HYPERPHOSPHATEMIA: Status: ACTIVE | Noted: 2023-01-18

## 2023-01-18 NOTE — ASSESSMENT & PLAN NOTE
Hgb 8.3- Continue AFIA therapy per procotol - stop AFIA therapy; starting dialysis and will receive AFIA therapy on HD

## 2023-01-18 NOTE — ASSESSMENT & PLAN NOTE
Serum Creatinine 5.75 / eGFR 7 (4.90/9 1/6/2023) ; Avoid NSAIDS, Assure 64 oz of water daily, Meds per med list above      She has attended a CKD Smart Class; She has a Left Upper arm AV Fistula - placed 9/23/22; She was seen by Dr. Cortes on 10/31 and an AV US was ordered / scheduled for 11/8.  Per Dr. Cortes - he will check the results of the US and if no specific problems with US we will plan to proceed with dialysis utilizing the AV fistula if dialysis is needed.     At last OV on 12/9/22:  Have discussed with the patient and her family that she is at the point of needing to start dialysis.  The family is reluctant to start.  I have asked if they would like a referral to an outside nephrologist for a second opinion and also offered an appointment with one of our local Nephrologist.   They prefer an outside nephrologist.   The family has requested a referral to Dr. Robe Talamantes in [place].       Update 12/23/22:  They have not heard anything from the referral that was placed.  I made them aware that I spoke with Dr. Talamantes and reviewed her labs with him and he is in agreement that she needs to start hemodialysis.      Update 1/6/23:  The patient remains asymptomatic and desires to wait to start hemodialysis.  I will see her back in 2 weeks.  She is aware should she develop symptoms to report to the [facility] ER.     Update 1/19/23:  The patient is finally in agreement to start hemo-dialysis.  Her paperwork will be submitted to Lesa Tyson for approval.

## 2023-01-18 NOTE — PROGRESS NOTES
Pt Name:  Mekhi Tinsley  Pt :  1950  Pt MRN:  [id number]    Date: 2023    Reason for visit:   Mekhi Tinsley is a 72 y.o. aa female  presenting for CKD follow up with serum creatinine 5.75, eGFR 7 and Chronic Kidney Disease Stage V.     Chief Complaint:   The patient denies any complaints today.    HPI:  The patient is being seen today for follow up CKD and the status of her kidney function. Since the last visit, patient reports no health changes.  The patient denies fatigue, weakness, poor appetite, metallic taste, nausea, cramping, chest pain, palpitations, SOB, orthopnea, PND, trouble concentrating, decreased urination.  She c/o  edema in lower extremities.     Home BPs when checked are <130/80 per patient. The patient is following a low sodium diet. The patient is avoiding NSAIDs. The patient is drinking 48 - 64  oz of water daily.     Since last visit on 23 patient reports no changes in medical history.      History:   Past Medical History:   Diagnosis Date    Diabetes mellitus type II, non insulin dependent 2022    Disorder of kidney and ureter     Diverticulosis     DM type 2 (diabetes mellitus, type 2)     DVT (deep vein thrombosis) in pregnancy     Essential hypertension 2022    HTN (hypertension)     Mild intermittent asthma, uncomplicated     PAD (peripheral artery disease)     S/P IVC filter      Past Surgical History:   Procedure Laterality Date     VENA CAVA FILTER PLACEMENT      COLONOSCOPY      DIALYSIS FISTULA CREATION Left 10/2022    HYSTERECTOMY       Family History   Problem Relation Age of Onset    Hypertension Mother     Hypertension Father     Hypertension Sister     Heart failure Sister     Hypertension Brother      Social History     Substance and Sexual Activity   Alcohol Use Never     Social History     Substance and Sexual Activity   Drug Use Yes    Types: Hydrocodone     Social History     Substance and Sexual Activity   Sexual Activity Never      reports never being sexually active.  Social History     Tobacco Use   Smoking Status Never   Smokeless Tobacco Never       Allergies:  Review of patient's allergies indicates:   Allergen Reactions    Ace inhibitors Swelling    Levofloxacin Other (See Comments)     Dropped fsbs to 50      Irbesartan Nausea Only    Tramadol Nausea Only         Current Outpatient Medications:     amLODIPine (NORVASC) 5 MG tablet, Take 2 tablets (10 mg total) by mouth 2 (two) times daily., Disp: 360 tablet, Rfl: 3    atorvastatin (LIPITOR) 20 MG tablet, Take 20 mg by mouth once daily., Disp: , Rfl:     calcitRIOL (ROCALTROL) 0.25 MCG Cap, Take 0.25 mcg by mouth every Mon, Wed, Fri., Disp: , Rfl:     cholecalciferol, vitamin D3, (VITAMIN D3) 25 mcg (1,000 unit) capsule, Take 1,000 Units by mouth once daily., Disp: , Rfl:     cloNIDine (CATAPRES) 0.1 MG tablet, Take 0.1 mg by mouth 1 (one) time if needed. Only takes prn if SBP > 180, Disp: , Rfl:     cyanocobalamin (VITAMIN B-12) 100 MCG tablet, Take 100 mcg by mouth once daily., Disp: , Rfl:     fluticasone propionate (FLONASE) 50 mcg/actuation nasal spray, 2 sprays by Each Nostril route as needed., Disp: , Rfl:     furosemide (LASIX) 40 MG tablet, Take 1 tablet (40 mg total) by mouth every Mon, Wed, Fri., Disp: 90 tablet, Rfl: 3    gabapentin (NEURONTIN) 100 MG capsule, Take 100 mg by mouth as needed., Disp: , Rfl:     glipiZIDE (GLUCOTROL) 2.5 MG TR24, Take 1 tablet (2.5 mg total) by mouth daily with breakfast., Disp: 30 tablet, Rfl: 0    hydrALAZINE (APRESOLINE) 100 MG tablet, Take 100 mg by mouth every 8 (eight) hours., Disp: , Rfl:     HYDROcodone-acetaminophen (NORCO) 5-325 mg per tablet, Take 1 tablet by mouth daily as needed., Disp: , Rfl:     methocarbamoL (ROBAXIN) 500 MG Tab, Take 500 mg by mouth as needed., Disp: , Rfl:     metoprolol succinate 50 mg CSpX, Take 50 mg by mouth once daily at 6am., Disp: , Rfl:     sodium bicarbonate 325 MG tablet, Take 2 tablets (650 mg total)  "by mouth 2 (two) times daily. (Patient taking differently: Take 650 mg by mouth 2 (two) times daily. Take 2 tablets 2 times a day), Disp: 180 tablet, Rfl: 3    tiZANidine 4 mg Cap, Take 4 mg by mouth as needed., Disp: , Rfl:     ROS:  Review of Systems   Constitutional:  Negative for activity change and appetite change.   HENT:  Negative for hearing loss.    Eyes:  Negative for visual disturbance.   Respiratory:  Negative for shortness of breath and wheezing.    Cardiovascular:  Positive for leg swelling. Negative for chest pain.   Gastrointestinal:  Negative for abdominal pain, diarrhea, nausea and vomiting.   Genitourinary:  Negative for decreased urine volume, dysuria, flank pain, frequency and urgency.   Neurological:  Negative for dizziness, weakness and headaches.     Physical Exam:   Vitals:   Vitals:    01/19/23 0935   BP: (!) 140/63   Pulse: (!) 52   SpO2: 100%   Weight: 89.4 kg (197 lb)   Height: 5' 6" (1.676 m)     Body mass index is 31.8 kg/m².    Physical Exam  Vitals reviewed. Exam conducted with a chaperone present (daughters present in room and on speaker phone).   Constitutional:       General: She is not in acute distress.     Appearance: Normal appearance. She is overweight.   HENT:      Head: Normocephalic and atraumatic.      Mouth/Throat:      Mouth: Mucous membranes are moist.   Eyes:      Extraocular Movements: Extraocular movements intact.      Pupils: Pupils are equal, round, and reactive to light.   Cardiovascular:      Rate and Rhythm: Regular rhythm. Bradycardia present.      Heart sounds: Murmur heard.   Systolic murmur is present with a grade of 3/6.      Arteriovenous access: Left arteriovenous access is present.     Comments: Left upper arm AV fistula is patent with good bruit and thrill   Pulmonary:      Effort: Pulmonary effort is normal.      Breath sounds: No wheezing, rhonchi or rales.   Musculoskeletal:         General: No swelling.      Right lower leg: 3+ Edema present.      " Left lower leg: 3+ Edema present.   Skin:     General: Skin is warm and dry.   Neurological:      Mental Status: She is alert and oriented to person, place, and time.   Psychiatric:         Mood and Affect: Mood normal.         Behavior: Behavior normal.       Labs/Tests:    Contains abnormal data RENAL FUNCTION PANEL     Ref Range & Units 10:48   Sodium 136 - 147 mmol/L 138    Potassium 3.5 - 5.1 mmol/L 3.5    Chloride 98 - 107 mmol/L 100    CO2 20 - 31 mmol/L 24    Glucose 70 - 99 mg/dL 119 High     BUN 9 - 23 mg/dL 72 High     Creatinine 0.55 - 1.02 mg/dL 5.75 High     Calcium 8.3 - 10.6 mg/dL 9.1    Phosphorus Level 2.4 - 5.1 mg/dL 5.4 High     Albumin Level 3.2 - 4.8 g/dL 4.3    Anion Gap 5.0 - 15.0 mmol/L 14.4    eGFR CKD-EPI >90 ml/min/1.73m2 7 Low       Lab Results   Component Value Date     (H) 01/18/2023    HGB 8.3 (L) 01/18/2023    HGB 8.4 (L) 01/10/2023    HGB 8.1 (L) 01/05/2023    IRON 55 01/05/2023    TIBC 227 (L) 01/05/2023    FERRITIN 500.6 (H) 01/05/2023    HEPBSAG NON-REACTIVE 11/03/2022    [id number] 58.2 01/18/2023       Lab Results   Component Value Date    UPROTCREA 1.5 (H) 01/18/2023    UPROTCREA 2.6 (H) 01/05/2023    UPROTCREA 3.4 (H) 12/22/2022    EXTIRONSATUR 24 (H) 01/05/2023    EXTIRONSATUR 18 12/22/2022    EXTIRONSATUR 37 (H) 10/19/2022         Diagnosis:  Plan and Assessment:  1. ESRD (end stage renal disease)  -     Hepatitis B Core Antibody, Total; Future; Expected date: 01/19/2023  -     Hepatitis B Surface Antibody, Qual/Quant; Future; Expected date: 01/19/2023  -     Hepatitis B Surface Antigen; Future; Expected date: 01/19/2023  -     Hepatitis C Antibody; Future; Expected date: 01/19/2023  -     Quantiferon Gold TB; Future; Expected date: 01/19/2023  -     Ambulatory referral/consult to Outpatient Dialysis; Future; Expected date: 01/19/2023    2. Type 2 diabetes mellitus with stage 5 chronic kidney disease not on chronic dialysis, without long-term current use of  insulin  Assessment & Plan:  Control status unknown - Continue Glucotrol 2.5 mg po daily       3. Hypertension with impaired renal function  Assessment & Plan:  Near goal -  Monitor BP, 2 Gram NA diet, Amlodipine 10 mg po BID, Hydralazine 100 mg po TID, Metoprolol 50 mg po daily, Change Clonidine 0.1 mg po prn SBP > 160 rather than > 180;  Increase Lasix 40 mg to M-W-F rather than prn only (she is up 7 lbs since her last OV on 1/6/23 despite increasing her Lasix).       4. Chronic kidney disease (CKD), active medical management without dialysis, stage 5  Overview:  Left upper arm cephalic vein transposition AV fistula on 9/23/22 - by Dr. Cortes     Assessment & Plan:  Serum Creatinine 5.75 / eGFR 7 (4.90/9 1/6/2023) ; Avoid NSAIDS, Assure 64 oz of water daily, Meds per med list above      She has attended a CKD Smart Class; She has a Left Upper arm AV Fistula - placed 9/23/22; She was seen by Dr. Cortes on 10/31 and an AV US was ordered / scheduled for 11/8.  Per Dr. Cortes - he will check the results of the US and if no specific problems with US we will plan to proceed with dialysis utilizing the AV fistula if dialysis is needed.     At last OV on 12/9/22:  Have discussed with the patient and her family that she is at the point of needing to start dialysis.  The family is reluctant to start.  I have asked if they would like a referral to an outside nephrologist for a second opinion and also offered an appointment with one of our local Nephrologist.   They prefer an outside nephrologist.   The family has requested a referral to Dr. Robe Talamantes in [place].       Update 12/23/22:  They have not heard anything from the referral that was placed.  I made them aware that I spoke with Dr. Talamantes and reviewed her labs with him and he is in agreement that she needs to start hemodialysis.      Update 1/6/23:  The patient remains asymptomatic and desires to wait to start hemodialysis.  I will see her back in 2 weeks.   She is aware should she develop symptoms [facility].     Update 1/19/23:  The patient is finally in agreement to start hemo-dialysis.  Her paperwork will be submitted to [facility] for approval.        Orders:  -     Hepatitis B Core Antibody, Total; Future; Expected date: 01/19/2023  -     Hepatitis B Surface Antibody, Qual/Quant; Future; Expected date: 01/19/2023  -     Hepatitis B Surface Antigen; Future; Expected date: 01/19/2023  -     Hepatitis C Antibody; Future; Expected date: 01/19/2023  -     Quantiferon Gold TB; Future; Expected date: 01/19/2023  -     Ambulatory referral/consult to Outpatient Dialysis; Future; Expected date: 01/19/2023    5. Hyperphosphatemia  Assessment & Plan:  Phos 5.4 - dietary counseling on foods to avoid that are high in phosphorus -       6. Arteriovenous fistula for hemodialysis in place, primary  Overview:  Left upper arm cephalic vein transposition AV fistula on 9/23/22 - by Dr. Cortes     Assessment & Plan:  Left AV Fistula is patent with good bruit and thrill       7. Nephrotic range proteinuria  Assessment & Plan:   1500 mg down from 2600 mg -       8. Metabolic acidosis  Assessment & Plan:  C02 24 - Continue NA HC03 650 mg po BID -      9. Secondary hyperparathyroidism of renal origin  Assessment & Plan:    - Continue Rocaltrol 25 mcg po to M-W-F; Stop Calcitriol once starting hemodialysis       10. Anemia in stage 5 chronic kidney disease, not on chronic dialysis  Assessment & Plan:  Hgb 8.3- Continue AFIA therapy per procotol - stop AFIA therapy; starting dialysis and will receive AFIA therapy on HD               11. Allergy to ACE inhibitors  Assessment & Plan:  Unable to use ACE due to allergy       12. Allergy to angiotensin receptor blockers (ARB)  Assessment & Plan:  Unable to use ARB due to allergy       13. Hyperlipidemia, mixed  Assessment & Plan:  Continue Lipitor 20 mg po daily       14. Pre-op testing  -     COVID-19 Routine  Screening       Have spent approximately 30 - 40 minutes discussing with the patient and the family the need to start Hemodialysis.  They are finally in agreement to start dialysis.  She will be sent for hepatitis, TB Gold, and Covid testing to submit all paperwork to [facility] for acceptance to start HD 3 x per week.  She will stop the AFIA therapy outpatient as it will be provided at her HD treatments.   She voices understanding.     My reconciliation of medication should not condone or support use of medications that have been previously prescribed for patients by other providers.  I am only documenting the current medications patients is taking and may change doses, add or discontinue medications based on information provided by the patient.     The patient has been provided with their current level of kidney function including eGFR and creatinine.    We discussed the potential for common complications of CKD including anemia, electrolyte abnormalities, abnormal fluid balance, mineral bone disease and malnutrition.    We discussed strategies to slow the progression of their kidney disease including:  Avoid nephrotoxic agents. Avoid over-the-counter and prescription NSAIDs (Ibuprofren, Motrin, Naproxyn, Aleve, Mobic, Celebrex, Toradol, Advil). All of these can worsen kidney function, elevate BP, cause fluid retention/swelling and elevate potassium. Avoid iodine contrast agents and gadolinium, which can worsen kidney function and/or cause kidney failure. Avoid phosphosoda bowel preps which can worsen kidney function.  Work to improve modifiable risk factors. Aim for good control of blood glucose without episodes of hypoglycemia. Notify the provider managing your diabetes if your blood glucose < 60. Aim for good blood pressure control without episodes of hypotension. Call the office if your systolic blood pressure is consistently < 110. Aim for good control of your cholesterol.  AIC goal <7.0  BP goal  <130/80        Keeping these in goal range will help prevent progression of cardiovascular disease            and chronic kidney disease.    We discussed dietary modifications:  Low sodium diet: 2 gm/d or less  Limit/avoid high potassium foods  Avoid potassium containing salt substitutes  Limit/avoid high phosphorus foods  Limit daily protein intake to 0.8-1 gm/kg of your ideal body weight.    We discussed lifestyle modifications:  Make sure you are drinking plenty of fluids--64 ounces (1/2 gallon) daily  Exercise at least 30 minutes 5 x per week (total 150 minutes per week), example brisk walking  Achieve and maintain a healthy weight (BMI 20-25)  Limit alcohol consumption to <2 drinks per day  Stop smoking  Make sure you stay current on important vaccines-- pneumonia vaccines (Pneumovax and Prevnar), flu vaccine, Hepatitis B (especially patients nearing renal replacement therapy and planning hemodialysis) and Covid-19 vaccine.     Recommendations:  Monitor your BP at home daily and record.  Bring readings to your next appt.  Call the office if your BP is persistently >130/80.  Seek urgent medical attention with signs and symptoms of uremia - extreme weakness, fatigue, confusion, anorexia, metallic taste in mouth, hiccoughs, cramping, itching, chest pain, swelling, or trouble sleeping.    Follow Up:   Follow up if symptoms worsen or fail to improve.

## 2023-01-18 NOTE — ASSESSMENT & PLAN NOTE
Near goal -  Monitor BP, 2 Gram NA diet, Amlodipine 10 mg po BID, Hydralazine 100 mg po TID, Metoprolol 50 mg po daily, Change Clonidine 0.1 mg po prn SBP > 160 rather than > 180;  Increase Lasix 40 mg to M-W-F rather than prn only (she is up 7 lbs since her last OV on 1/6/23 despite increasing her Lasix).

## 2023-01-19 ENCOUNTER — OFFICE VISIT (OUTPATIENT)
Dept: NEPHROLOGY | Facility: CLINIC | Age: 73
End: 2023-01-19
Payer: MEDICARE

## 2023-01-19 VITALS
OXYGEN SATURATION: 100 % | HEIGHT: 66 IN | SYSTOLIC BLOOD PRESSURE: 140 MMHG | HEART RATE: 52 BPM | WEIGHT: 197 LBS | BODY MASS INDEX: 31.66 KG/M2 | DIASTOLIC BLOOD PRESSURE: 63 MMHG

## 2023-01-19 DIAGNOSIS — E83.39 HYPERPHOSPHATEMIA: ICD-10-CM

## 2023-01-19 DIAGNOSIS — E11.22 TYPE 2 DIABETES MELLITUS WITH STAGE 5 CHRONIC KIDNEY DISEASE NOT ON CHRONIC DIALYSIS, WITHOUT LONG-TERM CURRENT USE OF INSULIN: ICD-10-CM

## 2023-01-19 DIAGNOSIS — N25.81 SECONDARY HYPERPARATHYROIDISM OF RENAL ORIGIN: ICD-10-CM

## 2023-01-19 DIAGNOSIS — N18.5 CHRONIC KIDNEY DISEASE (CKD), ACTIVE MEDICAL MANAGEMENT WITHOUT DIALYSIS, STAGE 5: ICD-10-CM

## 2023-01-19 DIAGNOSIS — N18.5 TYPE 2 DIABETES MELLITUS WITH STAGE 5 CHRONIC KIDNEY DISEASE NOT ON CHRONIC DIALYSIS, WITHOUT LONG-TERM CURRENT USE OF INSULIN: ICD-10-CM

## 2023-01-19 DIAGNOSIS — Z01.818 PRE-OP TESTING: ICD-10-CM

## 2023-01-19 DIAGNOSIS — N18.5 ANEMIA IN STAGE 5 CHRONIC KIDNEY DISEASE, NOT ON CHRONIC DIALYSIS: ICD-10-CM

## 2023-01-19 DIAGNOSIS — N18.6 ESRD (END STAGE RENAL DISEASE): Primary | ICD-10-CM

## 2023-01-19 DIAGNOSIS — Z88.8 ALLERGY TO ACE INHIBITORS: ICD-10-CM

## 2023-01-19 DIAGNOSIS — R80.9 NEPHROTIC RANGE PROTEINURIA: ICD-10-CM

## 2023-01-19 DIAGNOSIS — Z88.8 ALLERGY TO ANGIOTENSIN RECEPTOR BLOCKERS (ARB): ICD-10-CM

## 2023-01-19 DIAGNOSIS — E78.2 HYPERLIPIDEMIA, MIXED: ICD-10-CM

## 2023-01-19 DIAGNOSIS — I12.9 HYPERTENSION WITH IMPAIRED RENAL FUNCTION: ICD-10-CM

## 2023-01-19 DIAGNOSIS — Z99.2 ARTERIOVENOUS FISTULA FOR HEMODIALYSIS IN PLACE, PRIMARY: ICD-10-CM

## 2023-01-19 DIAGNOSIS — D63.1 ANEMIA IN STAGE 5 CHRONIC KIDNEY DISEASE, NOT ON CHRONIC DIALYSIS: ICD-10-CM

## 2023-01-19 DIAGNOSIS — E87.20 METABOLIC ACIDOSIS: ICD-10-CM

## 2023-01-19 PROCEDURE — 3288F PR FALLS RISK ASSESSMENT DOCUMENTED: ICD-10-PCS | Mod: CPTII,,, | Performed by: NURSE PRACTITIONER

## 2023-01-19 PROCEDURE — 3008F BODY MASS INDEX DOCD: CPT | Mod: CPTII,,, | Performed by: NURSE PRACTITIONER

## 2023-01-19 PROCEDURE — 1101F PR PT FALLS ASSESS DOC 0-1 FALLS W/OUT INJ PAST YR: ICD-10-PCS | Mod: CPTII,,, | Performed by: NURSE PRACTITIONER

## 2023-01-19 PROCEDURE — 3066F NEPHROPATHY DOC TX: CPT | Mod: CPTII,,, | Performed by: NURSE PRACTITIONER

## 2023-01-19 PROCEDURE — 1159F MED LIST DOCD IN RCRD: CPT | Mod: CPTII,,, | Performed by: NURSE PRACTITIONER

## 2023-01-19 PROCEDURE — 3066F PR DOCUMENTATION OF TREATMENT FOR NEPHROPATHY: ICD-10-PCS | Mod: CPTII,,, | Performed by: NURSE PRACTITIONER

## 2023-01-19 PROCEDURE — 99215 OFFICE O/P EST HI 40 MIN: CPT | Mod: ,,, | Performed by: NURSE PRACTITIONER

## 2023-01-19 PROCEDURE — 3078F PR MOST RECENT DIASTOLIC BLOOD PRESSURE < 80 MM HG: ICD-10-PCS | Mod: CPTII,,, | Performed by: NURSE PRACTITIONER

## 2023-01-19 PROCEDURE — 1159F PR MEDICATION LIST DOCUMENTED IN MEDICAL RECORD: ICD-10-PCS | Mod: CPTII,,, | Performed by: NURSE PRACTITIONER

## 2023-01-19 PROCEDURE — 1101F PT FALLS ASSESS-DOCD LE1/YR: CPT | Mod: CPTII,,, | Performed by: NURSE PRACTITIONER

## 2023-01-19 PROCEDURE — 3077F PR MOST RECENT SYSTOLIC BLOOD PRESSURE >= 140 MM HG: ICD-10-PCS | Mod: CPTII,,, | Performed by: NURSE PRACTITIONER

## 2023-01-19 PROCEDURE — 99215 PR OFFICE/OUTPT VISIT, EST, LEVL V, 40-54 MIN: ICD-10-PCS | Mod: ,,, | Performed by: NURSE PRACTITIONER

## 2023-01-19 PROCEDURE — 1160F RVW MEDS BY RX/DR IN RCRD: CPT | Mod: CPTII,,, | Performed by: NURSE PRACTITIONER

## 2023-01-19 PROCEDURE — 1160F PR REVIEW ALL MEDS BY PRESCRIBER/CLIN PHARMACIST DOCUMENTED: ICD-10-PCS | Mod: CPTII,,, | Performed by: NURSE PRACTITIONER

## 2023-01-19 PROCEDURE — 3078F DIAST BP <80 MM HG: CPT | Mod: CPTII,,, | Performed by: NURSE PRACTITIONER

## 2023-01-19 PROCEDURE — 3008F PR BODY MASS INDEX (BMI) DOCUMENTED: ICD-10-PCS | Mod: CPTII,,, | Performed by: NURSE PRACTITIONER

## 2023-01-19 PROCEDURE — 3077F SYST BP >= 140 MM HG: CPT | Mod: CPTII,,, | Performed by: NURSE PRACTITIONER

## 2023-01-19 PROCEDURE — 3288F FALL RISK ASSESSMENT DOCD: CPT | Mod: CPTII,,, | Performed by: NURSE PRACTITIONER

## 2023-01-19 RX ORDER — LIDOCAINE AND PRILOCAINE 25; 25 MG/G; MG/G
CREAM TOPICAL SEE ADMIN INSTRUCTIONS
Qty: 30 G | Refills: 11 | Status: SHIPPED | OUTPATIENT
Start: 2023-01-19

## 2023-02-01 ENCOUNTER — OUTSIDE PLACE OF SERVICE (OUTPATIENT)
Dept: NEPHROLOGY | Facility: CLINIC | Age: 73
End: 2023-02-01
Payer: MEDICARE

## 2023-02-01 PROCEDURE — 90960 PR ESRD SERVICES, PER MONTH, 20+ YR OLD, 4+ VISITS: ICD-10-PCS | Mod: ,,, | Performed by: INTERNAL MEDICINE

## 2023-02-01 PROCEDURE — 90960 ESRD SRV 4 VISITS P MO 20+: CPT | Mod: ,,, | Performed by: INTERNAL MEDICINE

## 2023-03-01 ENCOUNTER — OUTSIDE PLACE OF SERVICE (OUTPATIENT)
Dept: NEPHROLOGY | Facility: CLINIC | Age: 73
End: 2023-03-01
Payer: MEDICARE

## 2023-03-01 PROCEDURE — 90960 ESRD SRV 4 VISITS P MO 20+: CPT | Mod: ,,, | Performed by: INTERNAL MEDICINE

## 2023-03-01 PROCEDURE — 90960 PR ESRD SERVICES, PER MONTH, 20+ YR OLD, 4+ VISITS: ICD-10-PCS | Mod: ,,, | Performed by: INTERNAL MEDICINE

## 2023-04-01 ENCOUNTER — OUTSIDE PLACE OF SERVICE (OUTPATIENT)
Dept: NEPHROLOGY | Facility: CLINIC | Age: 73
End: 2023-04-01
Payer: MEDICARE

## 2023-04-01 PROCEDURE — 90960 ESRD SRV 4 VISITS P MO 20+: CPT | Mod: ,,, | Performed by: INTERNAL MEDICINE

## 2023-04-01 PROCEDURE — 90960 PR ESRD SERVICES, PER MONTH, 20+ YR OLD, 4+ VISITS: ICD-10-PCS | Mod: ,,, | Performed by: INTERNAL MEDICINE

## 2023-05-01 ENCOUNTER — OUTSIDE PLACE OF SERVICE (OUTPATIENT)
Dept: NEPHROLOGY | Facility: CLINIC | Age: 73
End: 2023-05-01
Payer: MEDICARE

## 2023-05-01 PROCEDURE — 90960 ESRD SRV 4 VISITS P MO 20+: CPT | Mod: ,,, | Performed by: INTERNAL MEDICINE

## 2023-05-01 PROCEDURE — 90960 PR ESRD SERVICES, PER MONTH, 20+ YR OLD, 4+ VISITS: ICD-10-PCS | Mod: ,,, | Performed by: INTERNAL MEDICINE

## 2023-06-01 ENCOUNTER — OUTSIDE PLACE OF SERVICE (OUTPATIENT)
Dept: NEPHROLOGY | Facility: CLINIC | Age: 73
End: 2023-06-01
Payer: MEDICARE

## 2023-06-01 PROCEDURE — 90960 PR ESRD SERVICES, PER MONTH, 20+ YR OLD, 4+ VISITS: ICD-10-PCS | Mod: ,,, | Performed by: INTERNAL MEDICINE

## 2023-06-01 PROCEDURE — 90960 ESRD SRV 4 VISITS P MO 20+: CPT | Mod: ,,, | Performed by: INTERNAL MEDICINE

## 2023-07-01 ENCOUNTER — OUTSIDE PLACE OF SERVICE (OUTPATIENT)
Dept: NEPHROLOGY | Facility: CLINIC | Age: 73
End: 2023-07-01
Payer: MEDICARE

## 2023-07-01 PROCEDURE — 90960 ESRD SRV 4 VISITS P MO 20+: CPT | Mod: ,,, | Performed by: INTERNAL MEDICINE

## 2023-07-01 PROCEDURE — 90960 PR ESRD SERVICES, PER MONTH, 20+ YR OLD, 4+ VISITS: ICD-10-PCS | Mod: ,,, | Performed by: INTERNAL MEDICINE

## 2023-08-01 ENCOUNTER — OUTSIDE PLACE OF SERVICE (OUTPATIENT)
Dept: NEPHROLOGY | Facility: CLINIC | Age: 73
End: 2023-08-01
Payer: MEDICARE

## 2023-08-01 PROCEDURE — 90960 ESRD SRV 4 VISITS P MO 20+: CPT | Mod: ,,, | Performed by: INTERNAL MEDICINE

## 2023-08-01 PROCEDURE — 90960 PR ESRD SERVICES, PER MONTH, 20+ YR OLD, 4+ VISITS: ICD-10-PCS | Mod: ,,, | Performed by: INTERNAL MEDICINE

## 2023-09-01 ENCOUNTER — OUTSIDE PLACE OF SERVICE (OUTPATIENT)
Dept: NEPHROLOGY | Facility: CLINIC | Age: 73
End: 2023-09-01
Payer: MEDICARE

## 2023-09-01 PROCEDURE — 90960 PR ESRD SERVICES, PER MONTH, 20+ YR OLD, 4+ VISITS: ICD-10-PCS | Mod: ,,, | Performed by: INTERNAL MEDICINE

## 2023-09-01 PROCEDURE — 90960 ESRD SRV 4 VISITS P MO 20+: CPT | Mod: ,,, | Performed by: INTERNAL MEDICINE

## 2023-10-01 ENCOUNTER — OUTSIDE PLACE OF SERVICE (OUTPATIENT)
Dept: NEPHROLOGY | Facility: CLINIC | Age: 73
End: 2023-10-01
Payer: MEDICARE

## 2023-10-01 PROCEDURE — 90960 ESRD SRV 4 VISITS P MO 20+: CPT | Mod: ,,, | Performed by: INTERNAL MEDICINE

## 2023-10-01 PROCEDURE — 90960 PR ESRD SERVICES, PER MONTH, 20+ YR OLD, 4+ VISITS: ICD-10-PCS | Mod: ,,, | Performed by: INTERNAL MEDICINE

## 2023-11-02 ENCOUNTER — OUTSIDE PLACE OF SERVICE (OUTPATIENT)
Dept: NEPHROLOGY | Facility: CLINIC | Age: 73
End: 2023-11-02
Payer: MEDICARE

## 2023-12-01 PROCEDURE — 90960 ESRD SRV 4 VISITS P MO 20+: CPT | Mod: ,,, | Performed by: INTERNAL MEDICINE

## 2023-12-05 ENCOUNTER — OUTSIDE PLACE OF SERVICE (OUTPATIENT)
Dept: NEPHROLOGY | Facility: CLINIC | Age: 73
End: 2023-12-05
Payer: MEDICARE